# Patient Record
Sex: MALE | Race: BLACK OR AFRICAN AMERICAN
[De-identification: names, ages, dates, MRNs, and addresses within clinical notes are randomized per-mention and may not be internally consistent; named-entity substitution may affect disease eponyms.]

---

## 2018-02-14 ENCOUNTER — HOSPITAL ENCOUNTER (INPATIENT)
Dept: HOSPITAL 35 - ER | Age: 68
LOS: 1 days | Discharge: HOME | DRG: 246 | End: 2018-02-15
Attending: FAMILY MEDICINE | Admitting: FAMILY MEDICINE
Payer: COMMERCIAL

## 2018-02-14 VITALS — SYSTOLIC BLOOD PRESSURE: 127 MMHG | DIASTOLIC BLOOD PRESSURE: 71 MMHG

## 2018-02-14 VITALS — SYSTOLIC BLOOD PRESSURE: 145 MMHG | DIASTOLIC BLOOD PRESSURE: 79 MMHG

## 2018-02-14 VITALS — DIASTOLIC BLOOD PRESSURE: 83 MMHG | SYSTOLIC BLOOD PRESSURE: 151 MMHG

## 2018-02-14 VITALS — WEIGHT: 144.7 LBS | HEIGHT: 62.99 IN | BODY MASS INDEX: 25.64 KG/M2

## 2018-02-14 VITALS — SYSTOLIC BLOOD PRESSURE: 144 MMHG | DIASTOLIC BLOOD PRESSURE: 72 MMHG

## 2018-02-14 VITALS — SYSTOLIC BLOOD PRESSURE: 155 MMHG | DIASTOLIC BLOOD PRESSURE: 87 MMHG

## 2018-02-14 VITALS — DIASTOLIC BLOOD PRESSURE: 71 MMHG | SYSTOLIC BLOOD PRESSURE: 127 MMHG

## 2018-02-14 VITALS — DIASTOLIC BLOOD PRESSURE: 92 MMHG | SYSTOLIC BLOOD PRESSURE: 162 MMHG

## 2018-02-14 VITALS — DIASTOLIC BLOOD PRESSURE: 70 MMHG | SYSTOLIC BLOOD PRESSURE: 130 MMHG

## 2018-02-14 DIAGNOSIS — Z82.49: ICD-10-CM

## 2018-02-14 DIAGNOSIS — I12.9: ICD-10-CM

## 2018-02-14 DIAGNOSIS — E78.5: ICD-10-CM

## 2018-02-14 DIAGNOSIS — E11.22: ICD-10-CM

## 2018-02-14 DIAGNOSIS — Z90.49: ICD-10-CM

## 2018-02-14 DIAGNOSIS — G89.29: ICD-10-CM

## 2018-02-14 DIAGNOSIS — M54.9: ICD-10-CM

## 2018-02-14 DIAGNOSIS — N17.0: ICD-10-CM

## 2018-02-14 DIAGNOSIS — Z88.1: ICD-10-CM

## 2018-02-14 DIAGNOSIS — N18.9: ICD-10-CM

## 2018-02-14 DIAGNOSIS — Z95.5: ICD-10-CM

## 2018-02-14 DIAGNOSIS — T82.855A: Primary | ICD-10-CM

## 2018-02-14 DIAGNOSIS — I25.110: ICD-10-CM

## 2018-02-14 LAB
ANION GAP SERPL CALC-SCNC: 11 MMOL/L (ref 7–16)
BASOPHILS NFR BLD AUTO: 0.9 % (ref 0–2)
BUN SERPL-MCNC: 24 MG/DL (ref 7–18)
CALCIUM SERPL-MCNC: 9.3 MG/DL (ref 8.5–10.1)
CHLORIDE SERPL-SCNC: 102 MMOL/L (ref 98–107)
CO2 SERPL-SCNC: 25 MMOL/L (ref 21–32)
CREAT SERPL-MCNC: 1.5 MG/DL (ref 0.7–1.3)
EOSINOPHIL NFR BLD: 6.5 % (ref 0–3)
ERYTHROCYTE [DISTWIDTH] IN BLOOD BY AUTOMATED COUNT: 14.5 % (ref 10.5–14.5)
GLUCOSE SERPL-MCNC: 206 MG/DL (ref 74–106)
GRANULOCYTES NFR BLD MANUAL: 37.8 % (ref 36–66)
HCT VFR BLD CALC: 33.7 % (ref 42–52)
HGB BLD-MCNC: 12.1 GM/DL (ref 14–18)
LYMPHOCYTES NFR BLD AUTO: 47.4 % (ref 24–44)
MCH RBC QN AUTO: 29.7 PG (ref 26–34)
MCHC RBC AUTO-ENTMCNC: 35.8 G/DL (ref 28–37)
MCV RBC: 83.1 FL (ref 80–100)
MONOCYTES NFR BLD: 7.4 % (ref 1–8)
NEUTROPHILS # BLD: 3.3 THOU/UL (ref 1.4–8.2)
PLATELET # BLD: 218 THOU/UL (ref 150–400)
POTASSIUM SERPL-SCNC: 4.3 MMOL/L (ref 3.5–5.1)
RBC # BLD AUTO: 4.06 MIL/UL (ref 4.5–6)
SODIUM SERPL-SCNC: 138 MMOL/L (ref 136–145)
TROPONIN I SERPL-MCNC: < 0.04 NG/ML (ref ?–0.06)
WBC # BLD AUTO: 8.7 THOU/UL (ref 4–11)

## 2018-02-14 PROCEDURE — 10081 I&D PILONIDAL CYST COMP: CPT

## 2018-02-15 VITALS — DIASTOLIC BLOOD PRESSURE: 82 MMHG | SYSTOLIC BLOOD PRESSURE: 157 MMHG

## 2018-02-15 VITALS — DIASTOLIC BLOOD PRESSURE: 73 MMHG | SYSTOLIC BLOOD PRESSURE: 160 MMHG

## 2018-02-15 LAB
ANION GAP SERPL CALC-SCNC: 11 MMOL/L (ref 7–16)
BUN SERPL-MCNC: 18 MG/DL (ref 7–18)
CALCIUM SERPL-MCNC: 8.5 MG/DL (ref 8.5–10.1)
CHLORIDE SERPL-SCNC: 103 MMOL/L (ref 98–107)
CO2 SERPL-SCNC: 23 MMOL/L (ref 21–32)
CREAT SERPL-MCNC: 1.3 MG/DL (ref 0.7–1.3)
ERYTHROCYTE [DISTWIDTH] IN BLOOD BY AUTOMATED COUNT: 14.2 % (ref 10.5–14.5)
GLUCOSE SERPL-MCNC: 215 MG/DL (ref 74–106)
HCT VFR BLD CALC: 32.5 % (ref 42–52)
HGB BLD-MCNC: 11.5 GM/DL (ref 14–18)
MCH RBC QN AUTO: 29.4 PG (ref 26–34)
MCHC RBC AUTO-ENTMCNC: 35.5 G/DL (ref 28–37)
MCV RBC: 82.6 FL (ref 80–100)
PLATELET # BLD: 213 THOU/UL (ref 150–400)
POTASSIUM SERPL-SCNC: 4.3 MMOL/L (ref 3.5–5.1)
RBC # BLD AUTO: 3.93 MIL/UL (ref 4.5–6)
SODIUM SERPL-SCNC: 137 MMOL/L (ref 136–145)
TROPONIN I SERPL-MCNC: 0.15 NG/ML (ref ?–0.06)
WBC # BLD AUTO: 8.6 THOU/UL (ref 4–11)

## 2018-02-15 PROCEDURE — 027034Z DILATION OF CORONARY ARTERY, ONE ARTERY WITH DRUG-ELUTING INTRALUMINAL DEVICE, PERCUTANEOUS APPROACH: ICD-10-PCS

## 2018-02-15 PROCEDURE — 4A023N7 MEASUREMENT OF CARDIAC SAMPLING AND PRESSURE, LEFT HEART, PERCUTANEOUS APPROACH: ICD-10-PCS

## 2018-02-15 PROCEDURE — B2111ZZ FLUOROSCOPY OF MULTIPLE CORONARY ARTERIES USING LOW OSMOLAR CONTRAST: ICD-10-PCS

## 2018-04-18 NOTE — H
[address]  [address]                    HISTORY AND PHYSICAL          
_______________________________________________________________________________
 
Name:       AMBER KIRAN SR            Room:           [address] Sh[place] HARDING#:  [id number]      Account #:      [id number]  
Admission:  04/18/18     Attend Phys:    Jerald June MD, 
Discharge:  04/19/18     Date of Birth:  [birth year]  
         Report #: [id number]
                                                                                
_______________________________________________________________________________
THIS REPORT FOR:  //name//                      
 
Please refer to the History and Physical performed in the physician's office.
 
 
 
 
 
 
 
 
 
 
 
 
 
 
 
 
 
 
 
 
 
 
 
 
 
 
 
 
 
 
 
 
 
 
 
 
 
 
 
 
 
                       
                                        By:                                
                 
D: 04/18/18     _______________________________________
T: 04/20/18 1437Medical Records Staff HINA       /AL

## 2018-04-18 NOTE — EKG
[place] [address]
Phone:  [phone]                     ELECTROCARDIOGRAM REPORT      
_______________________________________________________________________________
 
Name:       AMBER KIRAN SR            Room:           [address] [place]  
M.R.#:  [id number]      Account #:      [id number]  
Admission:  18     Attend Phys:    Jerald June MD, 
Discharge:               Date of Birth:  [birth year]  
         Report #: [id number]
    [id number]
_______________________________________________________________________________
THIS REPORT FOR:  //name//                      
 
                          [facility]
                                       
Test Date:    2018               Test Time:    15:02:11
Pat Name:     AMBER KIRAN           Department:   
Patient ID:   [id number]            Room:         [facility]
Gender:       M                        Technician:   [facility]
:          1950               Requested By: Jerald June
Order Number: [id number]    Reading MD:   Jerald June
                                 Measurements
Intervals                              Axis          
Rate:         57                       P:            -3
CA:           208                      QRS:          -27
QRSD:         100                      T:            -20
QT:           404                                    
QTc:          394                                    
                           Interpretive Statements
Sinus rhythm
Left ventricular hypertrophy
Borderline T abnormalities, inferior leads
Compared to ECG 2017 03:24:22
Ectopic atrial rhythm no longer present
T-wave abnormality still present
 
Electronically Signed On 2018 16:16:54 CDT by Jerald June
[email or web address]
 
 
 
 
 
 
 
 
 
 
 
 
 
 
 
 
  <ELECTRONICALLY SIGNED>
                                           By: Jerald June MD, FACC     
  18     1616
D: 18 1502   _____________________________________
T: 18 1502   Jerald June MD, [facility]       /EPI

## 2018-04-19 NOTE — EKG
[place] [address]
Phone:  [phone]                     ELECTROCARDIOGRAM REPORT      
_______________________________________________________________________________
 
Name:       AMBER KIRAN SR            Room:           [address]#:  [id number]      Account #:      [id number]  
Admission:  18     Attend Phys:    Jerald June MD, 
Discharge:  18     Date of Birth:  [birth year]  
         Report #: [id number]
    [id number]
_______________________________________________________________________________
THIS REPORT FOR:  //name//                      
 
                          [facility]
                                       
Test Date:    2018               Test Time:    08:47:57
Pat Name:     AMBER KIRAN           Department:   
Patient ID:   [id number]            Room:         [facility]
Gender:       M                        Technician:   
:          1950               Requested By: Jerald June
Order Number: [id number]    Reading MD:   Jerald June
                                 Measurements
Intervals                              Axis          
Rate:         67                       P:            60
WV:           188                      QRS:          -17
QRSD:         101                      T:            -40
QT:           401                                    
QTc:          424                                    
                           Interpretive Statements
Sinus rhythm
Abnormal R-wave progression, early transition
Left ventricular hypertrophy
Borderline T abnormalities, inferior leads
Compared to ECG 2018 15:02:11
No significant changes
 
Electronically Signed On 2018 17:49:13 CDT by Jerald June
[email or web address]
 
 
 
 
 
 
 
 
 
 
 
 
 
 
 
 
  <ELECTRONICALLY SIGNED>
                                           By: Jerald June MD, [facility]     
  18     1749
D: 18 0847   _____________________________________
T: 18 0847   Jerald June MD, [facility]       /EPI

## 2018-04-21 NOTE — CARD
[address]  [address]                    CARDIAC CATH REPORT           
_______________________________________________________________________________
 
Name:       AMBER KIRAN SR            Room:           [address] 
MEHDI#:  [id number]      Account #:      [id number]  
Admission:  04/18/18     Attend Phys:    Jerald June MD, 
Discharge:  04/19/18     Date of Birth:  [birth year]  
         Report #: [id number]
                                                                     [id number]
_______________________________________________________________________________
THIS REPORT FOR:  //name//                      
 
 
--------------- APPROVED REPORT --------------
 
 
Study performed:  04/18/2018 12:46:59
 
Patient Details
Patient Status: Out-Patient                  Room #: 
The patient is a 67 year-old male
 
Event Personnel
Jerald June  Cardiologist, Lyndsey Eason RN Circulator, 
Willie Jo (R) Monitor, Chetna Momin  
Scrub
 
Procedures Performed
MARLIN Place w/wo Plasty Single RCA left heart catheterization selective 
coronary arteriography
 
Indication
Unstable angina 
 
Risk Factors
Family History, Hypercholesterolemia, Hypertension
 
Previous Procedures/Diagnoses
Previous PCI
 
Procedure Narrative
The patient was brought electively to the Cardiac Catheterization 
Laboratory and was prepped and draped in a sterile manner. The right 
femoral was infiltrated with 1% Lidocaine subcutaneous anesthesia. A 
6fr Ultimum Sheath sheath was inserted into the Right Femoral Artery. 
Coronary angiography was performed using coronary diagnostic 
catheters. The right coronary system was accessed and visualized with 
a Diagnostic 3DRC catheter. The left coronary system was accessed and 
visualized with a Diagnostic JL 3.5 catheter. The left ventricle was 
accessed and visualized with a Diagnostic Straight Pig catheter. Left 
ventricular/Aortic Valve gradient assessed via catheter pullback. 
Pre-demployment femoral angiogram was performed . Closure device was 
deployed with a Fr Angioseal STS 6Fr. The patient tolerated the 
procedure well and there were no complications associated with the 
procedure. 
 
 
 
 
[place] [address]                    CARDIAC CATH REPORT           
_______________________________________________________________________________
 
Name:       AMBER KIRAN SR            [address]           [address]  [id number]      Account #:      [id number]  
Admission:  04/18/18     Attend Phys:    Jerald June MD, 
Discharge:  04/19/18     Date of Birth:  [birth year]  
         Report #: [id number]
                                                                     [id number]
_______________________________________________________________________________
Intraoperative Conscious Sedation
Sedation start time:  13:26           Case end Time:  
14:25    
Fentanyl  50 mcg    Versed  2 mg  
 
Fluoro Time:    15.1 minutes     
Dose:     DAP 46611 cGycm2  1013 mGy  
Contrast Type and Amount:  Visipaque 180 ml    
 
Coronary Angiography
The patient's coronary anatomy is right dominant. 
 
Diagnostic Cath
Left Main 0% narrowing
LAD  40% mid LAD narrowing
Circumflex 40% proximal circumflex narrowing with 50% narrowing 
within the proximal portion of the stented first marginal 
branch
Right Coronary Modest size dominant vessel with 80% proximal 
narrowing and 50% tubular mid vessel narrowing with 30% distal right 
coronary narrowing
 
Hemodynamics
The aortic pressure is 165/72 mmHg with a mean of 103 mmHg. The left 
ventricular pressure is 147/3 mmHg with a mean of mmHg. The left 
ventricular end diastolic pressure is 12 mmHg. There was no gradient 
across the aortic valve upon pullback. 
 
PCI Technique Lesion
Anticoagulation was achieved with Angiomax. Patient was preloaded 
with Angiomax IV 10.5 ml. Percutaneous coronary intervention was 
performed on the proximal right coronary artery. The lesion stenosis 
prior to intervention was 80% with LINDA 3 flow. A 6Fr AR 1 Guide 
Catheter was used to engage the ostium. A IG: ProwaterFlex 180CM 
Interventional Guidewire was used to cross the lesion.
 
BALLOON DILATION
A Balloon catheter Trek RX 2.25 X 12 was inserted and inflated up to 
14.00atm for 16seconds.
 
STENT DEPLOYMENT
A drug-eluting stent Xience Alpine RX 2.5X12 was inserted and 
inflated up to 20.00atm for 12seconds. Additional Inflation: 12.00atm 
for 14seconds. Additional Inflation: 15.00atm for 17seconds.
 
Final angiography reveals 0 % stenosis with LINDA 3 
 
 
 
[place] [address]                    CARDIAC CATH REPORT           
_______________________________________________________________________________
 
Name:       AMBER KIRAN SR            [address]:           [address]  [id number]      Account #:      [id number]  
Admission:  04/18/18     Attend Phys:    Jerald June MD, 
Discharge:  04/19/18     Date of Birth:  [birth year]  
         Report #: [id number]
                                                                     [id number]
_______________________________________________________________________________
flow.
 
Conclusion
#1 significant coronary artery disease characterized by the 
following:
 
A 40% mid LAD narrowing,
 
B 40% proximal circumflex narrowing with 50% narrowing of the 
proximal portion of the first marginal branch within a stented 
segment,
 
C 80% proximal right coronary narrowing with 50% tubular mid vessel 
narrowing and 30% distal right coronary narrowing
 
#2 mild systemic systolic hypertension
 
#3 successful percutaneous coronary intervention with deployment of 
drug-eluting stent at site of 80% proximal right coronary stenosis 
with 0% residual narrowing following stent deployment and LINDA-3 to  
flow the distal vessel. 
 
Recommendations
Aggressive Medical Therapy
 
Medications Administered
Aspirin (any)   
Prasugrel   
Angiomax bolus and infusion
 
 
 
 
 
 
 
 
 
 
 
 
 
 
 
<ELECTRONICALLY SIGNED>
                                        By:  Jerald June MD, FACC     
04/21/18     1329
D: 04/21/18 1329_______________________________________
T: 04/21/18 1329Jerald June MD, FAC        /INF

## 2018-04-22 NOTE — D
[address]  [address]                    DISCHARGE SUMMARY             
_______________________________________________________________________________
 
Name:       AMBER KIRAN SR            Room:           [address] [place] HARDING#:  [id number]      Account #:      [id number]  
Admission:  04/18/18     Attend Phys:    Jerald June MD, 
Discharge:  04/19/18     Date of Birth:  [birth year]  
         Report #: [id number]
                                                                     [id number]  
_______________________________________________________________________________
THIS REPORT FOR:  //name//                      
 
[facility]
 
DATE OF SERVICE:  04/19/2018
 
 
FINAL DISCHARGE DIAGNOSES:
1.  Unstable angina.
2.  Coronary artery disease.
3.  Status post recent percutaneous coronary intervention to the circumflex and
percutaneous coronary intervention of the right coronary artery on 04/18/2018.
4.  Type 2 diabetes.
5.  Hypertension.
6.  Hypercholesterolemia.
 
PROCEDURES:  04/18/2018-left heart catheterization, selective coronary
arteriography, percutaneous coronary intervention with deployment of
drug-eluting stent at the proximal right coronary artery site of 80% stenosis.
 
The patient is a very pleasant 67-year-old male with complex coronary artery
disease and multiple prior percutaneous coronary interventions, several weeks
ago, he presented with unstable angina, underwent stenting of a bifurcation
circumflex lesion involving the mid circumflex and the first marginal branch. 
He was also noted to have a moderately severe, 80% proximal right coronary
artery stenosis, has had some intercurrent chest discomfort compatible with
recurrent angina.  In this context, I performed cardiac catheterization on
04/18/2018.  That revealed widely patent mid circumflex stent with 30% first
marginal in-stent narrowing with 80% proximal right coronary artery narrowing
followed by tubular 50-60% mid right coronary artery stenosis.  There was 40%
proximal LAD narrowing.
 
Given this data and the clinical scenario, I proceeded with percutaneous
coronary intervention, deploying one 2.5 x 12 mm Xience Alpine drug-eluting
stent in the proximal right coronary artery with 0% residual narrowing and LINDA
3 flow of the distal vessel.
 
The patient did well post procedurally and there was good hemostasis at the
right femoral site of catheterization.  He ambulated in the hallways without
difficulty.
 
Laboratory on 04/19/2018, revealed a hemoglobin of 11.6, white blood cell count
of 7800 with _____ platelets.  Sodium 139, potassium 4.2, BUN 16, creatinine 1.4
(down from 1.6 preprocedurally) with a glucose of 166 mg percent.
 
 
 
 
[place] [address]                    DISCHARGE SUMMARY             
_______________________________________________________________________________
 
Name:       AMBER KIRAN SR            [address]:           [address]  [id number]      Account #:      [id number]  
Admission:  04/18/18     Attend Phys:    Jerald June MD, 
Discharge:  04/19/18     Date of Birth:  [birth year]  
         Report #: [id number]
                                                                     [id number]  
_______________________________________________________________________________
He was discharged to home in stable condition on the following medications: 
Ascorbic calcium or vitamin C 1000 mg daily, aspirin 81 mg daily, atorvastatin
20 mg at bedtime, vitamin D3 2000 units daily, glipizide 10 mg b.i.d.,
hydrochlorothiazide 12.5 mg daily, Imdur 30 mg daily, lisinopril 20 mg b.i.d.,
metformin 1000 mg 2-1/2 tablets daily to be resumed on 04/20/2018, metoprolol
tartrate 50 mg daily, prasugrel 10 mg daily, and Coenzyme Q _____ mg daily.
 
I will plan to see the patient in 4 weeks in the clinic for followup.
 
The patient discharged form room 221 on 04/19/2018.
 
 
 
 
 
 
 
 
 
 
 
 
 
 
 
 
 
 
 
 
 
 
 
 
 
 
 
 
 
 
 
 
 
 
<ELECTRONICALLY SIGNED>
                                        By:  Jerald June MD, FACC     
04/22/18     0955
D: 04/19/18 0959_______________________________________
T: 04/19/18 1121John JG June MD, FACC        /nt

## 2018-12-17 ENCOUNTER — HOSPITAL ENCOUNTER (INPATIENT)
Dept: HOSPITAL 35 - ER | Age: 68
LOS: 2 days | Discharge: HOME | DRG: 250 | End: 2018-12-19
Attending: HOSPITALIST
Payer: COMMERCIAL

## 2018-12-17 VITALS — WEIGHT: 153.1 LBS | BODY MASS INDEX: 27.12 KG/M2 | HEIGHT: 63 IN

## 2018-12-17 VITALS — SYSTOLIC BLOOD PRESSURE: 142 MMHG | DIASTOLIC BLOOD PRESSURE: 68 MMHG

## 2018-12-17 VITALS — SYSTOLIC BLOOD PRESSURE: 165 MMHG | DIASTOLIC BLOOD PRESSURE: 78 MMHG

## 2018-12-17 VITALS — DIASTOLIC BLOOD PRESSURE: 70 MMHG | SYSTOLIC BLOOD PRESSURE: 134 MMHG

## 2018-12-17 VITALS — SYSTOLIC BLOOD PRESSURE: 127 MMHG | DIASTOLIC BLOOD PRESSURE: 68 MMHG

## 2018-12-17 VITALS — DIASTOLIC BLOOD PRESSURE: 72 MMHG | SYSTOLIC BLOOD PRESSURE: 166 MMHG

## 2018-12-17 VITALS — SYSTOLIC BLOOD PRESSURE: 121 MMHG | DIASTOLIC BLOOD PRESSURE: 60 MMHG

## 2018-12-17 DIAGNOSIS — Z90.49: ICD-10-CM

## 2018-12-17 DIAGNOSIS — T82.867A: ICD-10-CM

## 2018-12-17 DIAGNOSIS — I10: ICD-10-CM

## 2018-12-17 DIAGNOSIS — N18.9: ICD-10-CM

## 2018-12-17 DIAGNOSIS — Z79.82: ICD-10-CM
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
## 2018-12-17 DIAGNOSIS — E83.42: ICD-10-CM

## 2018-12-17 DIAGNOSIS — E78.5: ICD-10-CM

## 2018-12-17 DIAGNOSIS — I12.9: ICD-10-CM

## 2018-12-17 DIAGNOSIS — E11.65: ICD-10-CM
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
## 2018-12-17 DIAGNOSIS — Z88.8: ICD-10-CM

## 2018-12-17 DIAGNOSIS — Z79.84: ICD-10-CM

## 2018-12-17 DIAGNOSIS — Z95.5: ICD-10-CM

## 2018-12-17 DIAGNOSIS — I50.33: ICD-10-CM

## 2018-12-17 DIAGNOSIS — I25.110: Primary | ICD-10-CM

## 2018-12-17 DIAGNOSIS — Z79.899: ICD-10-CM

## 2018-12-17 DIAGNOSIS — E11.22: ICD-10-CM

## 2018-12-17 LAB
ALBUMIN SERPL-MCNC: 3.8 G/DL (ref 3.4–5)
ALBUMIN SERPL-MCNC: 3.8 G/DL (ref 3.4–5)
ALT SERPL-CCNC: 17 U/L (ref 30–65)
ANION GAP SERPL CALC-SCNC: 8 MMOL/L (ref 7–16)
AST SERPL-CCNC: 15 U/L (ref 15–37)
BASOPHILS NFR BLD AUTO: 1.2 % (ref 0–2)
BILIRUB SERPL-MCNC: 0.5 MG/DL
BUN SERPL-MCNC: 22 MG/DL (ref 7–18)
CALCIUM SERPL-MCNC: 9.4 MG/DL (ref 8.5–10.1)
CHLORIDE SERPL-SCNC: 100 MMOL/L (ref 98–107)
CO2 SERPL-SCNC: 29 MMOL/L (ref 21–32)
CREAT SERPL-MCNC: 1.6 MG/DL (ref 0.7–1.3)
EOSINOPHIL NFR BLD: 6.5 % (ref 0–3)
ERYTHROCYTE [DISTWIDTH] IN BLOOD BY AUTOMATED COUNT: 13.5 % (ref 10.5–14.5)
ERYTHROCYTE [DISTWIDTH] IN BLOOD BY AUTOMATED COUNT: 13.9 % (ref 10.5–14.5)
GLUCOSE SERPL-MCNC: 325 MG/DL (ref 74–106)
GRANULOCYTES NFR BLD MANUAL: 46 % (ref 36–66)
HCT VFR BLD CALC: 32.8 % (ref 42–52)
HCT VFR BLD CALC: 33.3 % (ref 42–52)
HGB BLD-MCNC: 11.7 GM/DL (ref 14–18)
HGB BLD-MCNC: 11.8 GM/DL (ref 14–18)
INR PPP: 1.1
LYMPHOCYTES NFR BLD AUTO: 38.7 % (ref 24–44)
MAGNESIUM SERPL-MCNC: 1.3 MG/DL (ref 1.8–2.4)
MCH RBC QN AUTO: 29.9 PG (ref 26–34)
MCH RBC QN AUTO: 30 PG (ref 26–34)
MCHC RBC AUTO-ENTMCNC: 35.5 G/DL (ref 28–37)
MCHC RBC AUTO-ENTMCNC: 35.6 G/DL (ref 28–37)
MCV RBC: 83.9 FL (ref 80–100)
MCV RBC: 84.3 FL (ref 80–100)
MONOCYTES NFR BLD: 7.6 % (ref 1–8)
NEUTROPHILS # BLD: 2.7 THOU/UL (ref 1.4–8.2)
PLATELET # BLD: 221 THOU/UL (ref 150–400)
PLATELET # BLD: 226 THOU/UL (ref 150–400)
POTASSIUM SERPL-SCNC: 4.3 MMOL/L (ref 3.5–5.1)
PROT SERPL-MCNC: 7.3 G/DL (ref 6.4–8.2)
PROT SERPL-MCNC: 7.3 G/DL (ref 6.4–8.2)
PROTHROMBIN TIME: 11 SECONDS (ref 9.3–11.4)
RBC # BLD AUTO: 3.91 MIL/UL (ref 4.5–6)
RBC # BLD AUTO: 3.95 MIL/UL (ref 4.5–6)
SODIUM SERPL-SCNC: 137 MMOL/L (ref 136–145)
TROPONIN I SERPL-MCNC: <0.06 NG/ML (ref ?–0.06)
TSH SERPL-ACNC: 1.57 UIU/ML (ref 0.36–3.74)
VIT B12 SERPL-MCNC: 284 PG/ML (ref 193–986)
WBC # BLD AUTO: 5.9 THOU/UL (ref 4–11)
WBC # BLD AUTO: 6.5 THOU/UL (ref 4–11)

## 2018-12-17 PROCEDURE — 10081 I&D PILONIDAL CYST COMP: CPT

## 2018-12-17 NOTE — 2DMMODE
[facility]
[address]
[place]  [address]
Phone:  [phone] 2 D/M-MODE ECHOCARDIOGRAM     
_______________________________________________________________________________
 
Name:            DARRICK GOODMAN       Room #:        170-1       [facility] [place]
[facility]#:           [id number]          Account #:     [id number]  
Admission:       12/17/18         Attend Phys:   Ruddy Sánchez,
Discharge:                  Date of Birth: [birth year]  
Date of Service: 12/17/18 1541               Report #:      [id number]
        [id number]
_______________________________________________________________________________
THIS REPORT FOR:   //name//                          
 
 
--------------- APPROVED REPORT --------------
 
 
Study performed:  12/17/2018 14:33:15
 
EXAM: Comprehensive 2D, Doppler, and color-flow 
Echocardiogram 
Patient Location: In-Patient   
Room #:  ER 1     Status:  routine
 
      BSA:         1.71
HR: 65 bpm BP:          138/62 mmHg 
Rhythm: NSR     
 
Other Information 
Study Quality: Excellent
 
Indications
Diabetes
CAD
Chest Pain
Hypertension/HDD
 
2D Dimensions
RVDd:  24.23 mm  
IVSd:  10.82 (7-11mm) LVOT Diam:  21.75 (18-24mm) 
LVDd:  38.44 mm  
PWd:  9.96 (7-11mm) Ascending Ao:  28.74 (22-36mm)
LVDs:  25.92 (25-40mm) 
Aortic Root:  28.88 mm IVC:  6.00 mm
 
Volumes
Left Atrial Volume (Systole) 
Single Plane 4CH:  41.36 mL Single Plane 2CH:  32.47 mL
    LA ESV Index:  23.00 mL/m2
 
Aortic Valve
AoV Peak Pieter.:  1.11 m/s 
AO Peak Gr.:  4.89 mmHg  LVOT Max PG:  3.63 mmHg
    LVOT Max V:  0.95 m/s
PINKY Vmax: 3.20 cm2  
 
Mitral Valve
 
 
[facility]
[address]
[place]  [address]
Phone:  [phone]                    2 D/M-MODE ECHOCARDIOGRAM     
_______________________________________________________________________________
 
Name:            REXDARRICK RICHARD       Room #:        170-1       [facility] [place]
[facility]#:           [id number]          Account #:     [id number]  
Admission:       12/17/18         Attend Phys:   Ruddy Sánchez,
Discharge:                  Date of Birth: [birth year]  
Date of Service: 12/17/18 1541               Report #:      [id number]
        [id number]
_______________________________________________________________________________
    E/A Ratio:  0.7
    MV Decel. Time:  214.33 ms
MV E Max Pieter.:  0.65 m/s 
MV A Pieter.:  0.90 m/s  
MV PHT:  62.16 ms  
IVRT:  107.27 ms  
 
Pulmonary Valve
PV Peak Pieter.:  0.87 m/s PV Peak Gr.:  3.04 mmHg
 
Pulmonary Vein
P Vein S:    0.43 m/s P Vein A:  0.40 m/s
P Vein D:   0.30 m/s P Vein A Dur.:  101.5 msec
P Vein S/D Ratio:  1.43 
 
Tricuspid Valve
 RAP Estimate:  5.00 mmHg
 
Left Ventricle
The left ventricle is normal size. There is normal left ventricular 
wall thickness. The left ventricular systolic function is normal. The 
left ventricular ejection fraction is within the normal range. LVEF 
is 55%. Mild diastolic dysfunction is present (impaired relaxation 
pattern).
 
Right Ventricle
The right ventricle is normal size. The right ventricular systolic 
function is normal.
 
Atria
The left atrium size is normal. The right atrium size is 
normal.
 
Aortic Valve
Mild aortic valve sclerosis. Trace aortic regurgitation. There is no 
aortic valvular stenosis.
 
Mitral Valve
The mitral valve is normal in structure. Trace mitral regurgitation. 
No evidence of mitral valve stenosis.
 
Tricuspid Valve
The tricuspid valve is normal in structure. Trace tricuspid 
regurgitation. Unable to assess PA pressure.
 
Pulmonic Valve
 
 
[facility]
[address] [place]  [address]
Phone:  [phone]                    2 D/M-MODE ECHOCARDIOGRAM     
_______________________________________________________________________________
 
Name:            DARRICK GOODMAN       Room #:        170-1       [place] [place]
M.R.#:           [id number]          Account #:     [id number]  
Admission:       12/17/18         Attend Phys:   Ruddy Sánchez,
Discharge:                  Date of Birth: [birth year]  
Date of Service: 12/17/18 1541               Report #:      [id number]
        [id number]
_______________________________________________________________________________
The pulmonary valve is normal in structure. Trace to mild pulmonic 
regurgitation.
 
Great Vessels
The aortic root is normal in size. IVC is normal in size and 
collapses &gt;50% with inspiration.
 
Pericardium
There is no pericardial effusion.
 
&lt;Conclusion&gt;
The left ventricular systolic function is normal.
The left ventricular ejection fraction is within the normal range.
 
 
 
 
 
 
 
 
 
 
 
 
 
 
 
 
 
 
 
 
 
 
 
 
 
 
 
 
 
 
 
  <ELECTRONICALLY SIGNED>
   By: Martín Diggs MD, [facility]      
  12/17/18     1541
D: 12/17/18 1541                           _____________________________________
T: 12/17/18 1541                           Martín Diggs MD, FAC        /INF

## 2018-12-17 NOTE — NUR
TO THE UBNIT FROM THE ER - ADMISSION ASSESSMENT AS CHARTED - PT TO THE CATH
LAB IN THE AM -  NO CO'S OF PAIN OR NAUSEA.  APPEARS TO BE COMFROTABLE AT THE
PRESENT TIME.

## 2018-12-18 VITALS — SYSTOLIC BLOOD PRESSURE: 168 MMHG | DIASTOLIC BLOOD PRESSURE: 84 MMHG

## 2018-12-18 VITALS — DIASTOLIC BLOOD PRESSURE: 65 MMHG | SYSTOLIC BLOOD PRESSURE: 117 MMHG

## 2018-12-18 VITALS — SYSTOLIC BLOOD PRESSURE: 148 MMHG | DIASTOLIC BLOOD PRESSURE: 75 MMHG

## 2018-12-18 VITALS — SYSTOLIC BLOOD PRESSURE: 156 MMHG | DIASTOLIC BLOOD PRESSURE: 76 MMHG

## 2018-12-18 LAB
ANION GAP SERPL CALC-SCNC: 10 MMOL/L (ref 7–16)
BUN SERPL-MCNC: 19 MG/DL (ref 7–18)
CALCIUM SERPL-MCNC: 9.5 MG/DL (ref 8.5–10.1)
CHLORIDE SERPL-SCNC: 102 MMOL/L (ref 98–107)
CHOLEST SERPL-MCNC: 101 MG/DL (ref ?–200)
CO2 SERPL-SCNC: 27 MMOL/L (ref 21–32)
CREAT SERPL-MCNC: 1.6 MG/DL (ref 0.7–1.3)
ERYTHROCYTE [DISTWIDTH] IN BLOOD BY AUTOMATED COUNT: 13.4 % (ref 10.5–14.5)
EST. AVERAGE GLUCOSE BLD GHB EST-MCNC: 226 MG/DL
GLUCOSE SERPL-MCNC: 174 MG/DL (ref 74–106)
GLYCOHEMOGLOBIN (HGB A1C): 9.5 % (ref 4.8–5.6)
HCT VFR BLD CALC: 33.5 % (ref 42–52)
HDLC SERPL-MCNC: 30 MG/DL (ref 40–?)
HGB BLD-MCNC: 11.5 GM/DL (ref 14–18)
LDLC SERPL-MCNC: 49 MG/DL (ref ?–100)
MAGNESIUM SERPL-MCNC: 1.7 MG/DL (ref 1.8–2.4)
MCH RBC QN AUTO: 29.1 PG (ref 26–34)
MCHC RBC AUTO-ENTMCNC: 34.4 G/DL (ref 28–37)
MCV RBC: 84.5 FL (ref 80–100)
PLATELET # BLD: 227 THOU/UL (ref 150–400)
POTASSIUM SERPL-SCNC: 3.9 MMOL/L (ref 3.5–5.1)
RBC # BLD AUTO: 3.96 MIL/UL (ref 4.5–6)
SODIUM SERPL-SCNC: 139 MMOL/L (ref 136–145)
TC:HDL: 3.4 RATIO
TRIGL SERPL-MCNC: 112 MG/DL (ref ?–150)
VLDLC SERPL CALC-MCNC: 22 MG/DL (ref ?–40)
WBC # BLD AUTO: 6.9 THOU/UL (ref 4–11)

## 2018-12-18 NOTE — CATHLAB
[facility]
[employer]
[place]  [address]
Phone:  [phone]                    INVASIVE PROCEDURE REPORT     
_______________________________________________________________________________
 
Name:            REXDARRICK HILARY       Room #:        200-I       ADM IN
[facility].#:           [id number]          Account #:     [id number]  
Admission:       12/17/18         Attend Phys:   Ruddy Sánchez,
Discharge:                  Date of Birth: [birth year]  
Date of Service: 12/18/18 1601               Report #:      [id number]
        [id number]
_______________________________________________________________________________
THIS REPORT FOR:   //name//                          
 
 
--------------- APPROVED REPORT --------------
 
 
Study performed:  12/18/2018 13:20:29
 
Patient Details
Patient Status: In-Patient                  Room #: 
The patient is a 68 year-old male
 
Event Personnel
Martín Diggs  Interventional Cardiologist, Julius Colunga RN RN, 
Sandifer, David Scrub, Brea Montez  Monitor
 
Procedures Performed
Art Access - R femoral artery*  30834 Initial Mod Sed Same Phys/QHP 
Gr5y 262003 76789 Mod Sed Same Phys/QHP Ea 776696 Left Heart Cath 
w/or w/o Coronaries 5410330 C PTCA Single Vessel OM 5533200 
PCISINGLE Hemostasis w/ Angioseal
 
Indication
Unstable angina , Chest pain
 
Risk Factors
Arterial Hypertension, Hypercholesterolemia, Diabetes 
 
Previous Procedures/Diagnoses
Previous PCI
 
Admission/Lab Medications/Medications given during 
procedure
Glycoprotein IllbIlla Inhibitors, Heparin Unfract.
 
Procedure Narrative
The patient was brought electively to the Cardiac Catheterization 
Laboratory and was prepped and draped in a sterile manner. The Right 
Groin^ was infiltrated with 1% Lidocaine subcutaneous anesthesia. A 
PINNACLE 6FR Sheath #[id number] sheath was inserted into the RFA^. 
Coronary angiography was performed using coronary diagnostic 
catheters. The right coronary system was accessed and visualized with 
a JR 4 catheter. The left coronary system was accessed and visualized 
with a JL 4 catheter. The left ventricle was accessed and visualized 
with a JR 4 catheter. Left ventricular/Aortic Valve gradient assessed 
via catheter pullback. Pre-demployment femoral angiogram was 
 
 
[facility]
[employer]
[place]  [address]
Phone:  [phone]                    INVASIVE PROCEDURE REPORT     
_______________________________________________________________________________
 
Name:            DARRICK GOODMAN       Room #:        200-I       [facility] [place]
[facility]#:           [id number]          Account #:     [id number]  
Admission:       12/17/18         Attend Phys:   Ruddy Sánchez,
Discharge:                  Date of Birth: [birth year]  
Date of Service: 12/18/18 1601               Report #:      [id number]
        [id number]
_______________________________________________________________________________
performed . Closure device was deployed with a 6 Fr Angioseal. The 
patient tolerated the procedure well and there were no complications 
associated with the procedure. There was no hematoma.
 
Intraoperative Conscious Sedation
Sedation start time:  14:30           Case end Time:  
15:15    
Fentanyl  50 mcg    Versed  --2 mg  
 
Fluoro Time:    4.10 minutes    
Dose:     DAP 2948.00 cGycm2  340 mGy  
Contrast Type and Amount:  Visipaque 110 ml   
 
Coronary Angiography
The patient's coronary anatomy is right dominant. 
 
Diagnostic Cath
Left Main 0% stenosis
LAD  30% mid stenosis
Circumflex mid stent without restenosis
OM2  proximal stent with 90% restenosis and thrombus
Right Coronary proximal stent without restenosis.  80% stenosis noted 
in the mid rca, and 50% in the distal rca
 
Left Ventriculography
Left Ventriculography was not performed.     
 
Hemodynamics
The aortic pressure is 149/61 mmHg with a mean of 94 mmHg. The left 
ventricular pressure is 156/18 mmHg with a mean of mmHg. The left 
ventricular end diastolic pressure is 20 mmHg. There was no gradient 
across the aortic valve upon pullback. Pullback from the left 
ventricle to the aorta revealed no gradient across the aortic 
valve.
 
PCI Technique Lesion
Anticoagulation was achieved with Heparin. iv integrelin given 
Percutaneous coronary intervention was performed on the second obtuse 
marginal branch segment. The lesion stenosis prior to intervention 
was 90% with JESSICA 3 flow. A VISTA 6FR XB 3 #[id number] Guide Catheter was 
used to engage the lm ostium. A BMW Wire .014 X 190CM #[id number] 
Interventional Guidewire was used to cross the lesion.
 
BALLOON DILATION
A Balloon catheter TREK RX 2.5 X 12 #[id number] was inserted and inflated 
up to 12.00atm for 19seconds. Repeat angiography revealed the 
 
 
[facility]
[address] [place]  [address]
Phone:  [phone]                    INVASIVE PROCEDURE REPORT     
_______________________________________________________________________________
 
Name:            DARRICK GOODMAN       Room #:        200-I       [facility] [place]
..#:           [id number]          Account #:     [id number]  
Admission:       12/17/18         Attend Phys:   Ruddy Sánchez,
Discharge:                  Date of Birth: [birth year]  
Date of Service: 12/18/18 1601               Report #:      [id number]
        [id number]
_______________________________________________________________________________
following post-dilatation results: 0% stenosis. Additional Inflation: 
16.00atm for 23seconds. Additional Inflation: 17.00atm for 16seconds. 
Thrombus noted at start of procedure but none seen following 
PTCA.
 
Final angiography reveals 0 % stenosis with JESSICA 3 
flow.
 
Conclusion
1.  no restenosis noted of stent in the circumflex, but 90% stenosis 
of stent in the second marginal branch with thrombus
2.  no restenosis noted of stent in the proximal rca, but 80% 
stenosis in the mid rca
3.  successful PTCA of stent in the 2nd marginal branch 
 
Recommendations
1.  plan staged stenting of mid rca at a later date
 
Medications Administered
Ticagrelor
 
 
 
 
 
 
 
 
 
 
 
 
 
 
 
 
 
 
 
 
 
 
 
 
  <ELECTRONICALLY SIGNED>
   By: Martín Diggs MD, FACC      
  12/18/18     1601
D: 12/18/18 1601                           _____________________________________
T: 12/18/18 1601                           Martín Diggs MD, FACC        /INF

## 2018-12-18 NOTE — EKG
[address] [place] [employer]  [address]
Phone:  [phone]                    ELECTROCARDIOGRAM REPORT      
_______________________________________________________________________________
 
Name:       DARRICK GOODMAN       Room #:         200-I       ADM IN  
M.R.#:      [id number]     Account #:      [id number]  
Admission:  18    Attend Phys:    Ruddy Sánchez MD 
Discharge:              Date of Birth:  [birth year]  
                                                          Report #: [id number]
   [id number]
_______________________________________________________________________________
THIS REPORT FOR:   //name//                          
 
                          [facility]
                                       
Test Date:    2018               Test Time:    15:58:20
Pat Name:     DARRICK GOODMAN           Department:   
Patient ID:   [id number]            Room:         200 I
Gender:       M                        Technician:   OSMANY MENDOZA
:          1950               Requested By: Martín Diggs
Order Number: [id number] MD:   Chuy iRco
                                 Measurements
Intervals                              Axis          
Rate:         56                       P:            49
MI:           203                      QRS:          -29
QRSD:         100                      T:            -29
QT:           419                                    
QTc:          405                                    
                           Interpretive Statements
Sinus rhythm
Left ventricular hypertrophy
Anterior Q waves, possibly due to LVH
Borderline T abnormalities, inferior leads
Compared to ECG 2018 12:16:14
No significant change was found
Electronically Signed On 2018 17:26:52 CST by Chuy Rico
[email or web address]
 
 
 
 
 
 
 
 
 
 
 
 
 
 
 
 
 
  <ELECTRONICALLY SIGNED>
   By: Chuy Rico MD, [facility]   
  18     1726
D: 18 1558                           _____________________________________
T: 18 1558                           Chuy Rico MD, [facility]     /EPI

## 2018-12-18 NOTE — NUR
ASSESSMENT AS CHARTED - MEDS A PER MAR -  NO CO'S OF PAIN OR NAUSEA.  PT GIVEN
CLEAR LIQUID DIET AND THEN MADE NPO FOR CATH THIS AFTERNOON.  PT BLOOD SUGAR
NOT COVERED DUE TO NPO STATUS.  PY UP AD BHAVESH IN ROOM.  TO CATH @ 1400.  PT
RETUNRNED FROM CATH - VSS -  SALINE AND INTEGRILLIN RUNNING AS ORDERED.
GROIN SITE STABLE WITH ANGIOSEAL INSITU - PT ABLE TO BE OFF BEDREST @ 1915.
KUSHAL DIET AND FLUIDS.  BROTHER AT THE BEDSIDE - PATIENT AWAKE AFTER PROCEDURE.
NO CO'S AT THE PRESENT

## 2018-12-18 NOTE — EKG
[address]  [address]
Phone:  [phone]                    ELECTROCARDIOGRAM REPORT      
_______________________________________________________________________________
 
Name:       FORTUNATODARRICK       Room #:         200-I       ADM IN  
M.R.#:      [id number]     Account #:      [id number]  
Admission:  18    Attend Phys:    Ruddy Sánchez MD 
Discharge:              Date of Birth:  [birth year]  
                                                          Report #: [id number]
   [id number]
_______________________________________________________________________________
THIS REPORT FOR:   //name//                          
 
                         [facility] ED
                                       
Test Date:    2018               Test Time:    12:16:14
Pat Name:     DARRICK GOODMAN           Department:   
Patient ID:   [id number]            Room:         200
Gender:       M                        Technician:   RODRIGO
:          1950               Requested By: Fortunato Campoverde
Order Number: [id number] MD:   Chuy Rico
                                 Measurements
Intervals                              Axis          
Rate:         70                       P:            -57
[place]:           191                      QRS:          -31
QRSD:         99                       T:            14
QT:           378                                    
QTc:          408                                    
                           Interpretive Statements
Sinus or ectopic atrial rhythm
Left ventricular hypertrophy
Compared to ECG 02/15/2018 06:32:57
No significant change was found
Electronically Signed On 2018 8:12:09 CST by Chuy Rico
[email or web address]
 
 
 
 
 
 
 
 
 
 
 
 
 
 
 
 
 
 
 
  <ELECTRONICALLY SIGNED>
   By: Chuy Rico MD, [facility]   
  12/812
D: 18                           _____________________________________
T: 18                           Chuy Rico MD, [facility]     /EPI

## 2018-12-18 NOTE — NUR
ASSUMED PT CARE AT 1900 WITH BEDSIDE REPORT COMPLETED. NO SIGN OF DISTRESS
NOTED IN PT. PT IS ALERT AND ORIENTED WITH FAMILY AT BEDSIDE. PT DENIES ANY
CHEST PAIN. SCHEDULED MED ADMINISTERED TO PT, PT IS NPO FOR POSSIBLE CARDIAC
CATH. DENIES ANY NEEDS, ASSESSMENT COMPLETED. DENIES ANY FURTHER NEED AT THIS
TIME

## 2018-12-19 VITALS — SYSTOLIC BLOOD PRESSURE: 147 MMHG | DIASTOLIC BLOOD PRESSURE: 75 MMHG

## 2018-12-19 VITALS — DIASTOLIC BLOOD PRESSURE: 70 MMHG | SYSTOLIC BLOOD PRESSURE: 150 MMHG

## 2018-12-19 VITALS — SYSTOLIC BLOOD PRESSURE: 168 MMHG | DIASTOLIC BLOOD PRESSURE: 90 MMHG

## 2018-12-19 VITALS — DIASTOLIC BLOOD PRESSURE: 90 MMHG | SYSTOLIC BLOOD PRESSURE: 168 MMHG

## 2018-12-19 LAB
ANION GAP SERPL CALC-SCNC: 9 MMOL/L (ref 7–16)
BUN SERPL-MCNC: 19 MG/DL (ref 7–18)
CALCIUM SERPL-MCNC: 9.1 MG/DL (ref 8.5–10.1)
CHLORIDE SERPL-SCNC: 102 MMOL/L (ref 98–107)
CO2 SERPL-SCNC: 25 MMOL/L (ref 21–32)
CREAT SERPL-MCNC: 1.7 MG/DL (ref 0.7–1.3)
ERYTHROCYTE [DISTWIDTH] IN BLOOD BY AUTOMATED COUNT: 13.6 % (ref 10.5–14.5)
GLUCOSE SERPL-MCNC: 175 MG/DL (ref 74–106)
HCT VFR BLD CALC: 33.5 % (ref 42–52)
HGB BLD-MCNC: 11.7 GM/DL (ref 14–18)
MAGNESIUM SERPL-MCNC: 1.5 MG/DL (ref 1.8–2.4)
MCH RBC QN AUTO: 29.3 PG (ref 26–34)
MCHC RBC AUTO-ENTMCNC: 34.9 G/DL (ref 28–37)
MCV RBC: 84.1 FL (ref 80–100)
PLATELET # BLD: 228 THOU/UL (ref 150–400)
POTASSIUM SERPL-SCNC: 4 MMOL/L (ref 3.5–5.1)
RBC # BLD AUTO: 3.99 MIL/UL (ref 4.5–6)
SODIUM SERPL-SCNC: 136 MMOL/L (ref 136–145)
WBC # BLD AUTO: 7.5 THOU/UL (ref 4–11)

## 2018-12-19 NOTE — HC
[facility]
[address]
[place]   [address]                     CONSULTATION                  
_______________________________________________________________________________
 
Name:       DARRICK GOODMAN SR      Room #:         200-I       [facility] [place]  
..#:      [id number]                       Account #:      [id number]  
Admission:  12/17/18    Attend Phys:    Ruddy Sánchez MD 
Discharge:  12/19/18    Date of Birth:  [birth year]  
                                                          Report #: [id number]
                                                                    [id number]   
_______________________________________________________________________________
THIS REPORT FOR:   //name//                          
 
CC: Martín Carson MD
 
DATE OF SERVICE:  12/17/2018
 
 
CARDIOLOGY CONSULTATION
 
HISTORY OF PRESENT ILLNESS:  The patient is a 68-year-old single black male who
I was asked to see in the hospital after he complained of chest pain.  The
patient had apparently a balloon angioplasty at [facility]
approximately 10 years ago.  He had his first stent placed in 2014 at [facility] in the circumflex artery by Dr. Mccall.  He had 2 additional
stents placed in the circumflex artery in April 2017.  He was admitted here to
[facility] in February 2018 complaining of chest pain.  I
actually performed repeat cardiac catheterization in February from the right
radial artery; however, because of the abnormal takeoff of the left main artery,
it was decided to perform intervention from the right femoral artery.  There was
a long stent in the circumflex across the takeoff of a large second marginal
branch.  There was 60% restenosis at the distal portion of the stent.  The
marginal branch had an ostial 99% stenosis.  The right coronary had a 60%
stenosis.  Left ventriculography was not performed.  I performed angioplasty of
the second marginal branch.  A single drug-eluting stent was placed in the
marginal branch.  Kissing balloon angioplasty was then performed of the
circumflex and the marginal branch.  There was 0% residual narrowing following
stenting.  He was then discharged.  He notes that since his discharge, he was
actually admitted to [facility] a month later and had additional
stenting.  He did well until the past few weeks.  He has been having recurrent
chest pain.  Describes a burning in his chest.  It has not necessarily related
to any exertion or meals.  He can occasionally make some belch.  He has taken
nitroglycerin that did not seem to help.  He denied associated shortness of
breath, diaphoresis or nausea.  He has had no bleeding.  Denied any cough or
trauma to his chest.  He denies exertional dyspnea, palpitations or syncope.
 
PAST MEDICAL HISTORY:  He has had back surgery, appendectomy, hypertension,
diabetes, hyperlipidemia.
 
MEDICATIONS:  Include aspirin, Lipitor, glipizide, hydrochlorothiazide, Imdur,
lisinopril, metformin, metoprolol, Effient.
 
ALLERGIES:  HE HAS AN INTOLERANCE TO NIACIN.
 
 
 
 
[facility]
[address] [place]   [address]                     CONSULTATION                  
_______________________________________________________________________________
 
Name:       DARRICK GOODMAN       Room #:         200-I       [facility] [place]  
M.R.#:      [id number]                       Account #:      [id number]  
Admission:  12/17/18    Attend Phys:    Ruddy Sánchez MD 
Discharge:  12/19/18    Date of Birth:  [birth year]  
                                                          Report #: [id number]
                                                                    [id number]   
_______________________________________________________________________________
FAMILY HISTORY:  Significant for diabetes.
 
SOCIAL HISTORY:  He is , lives with his son in [place], [place]. 
Retired .  No smoking or alcohol abuse.
 
REVIEW OF SYSTEMS:  He has had no history of stroke, asthma, peptic ulcer
disease, liver disease, kidney disease, cancer, psychiatric illness or chronic
skin condition.
 
PHYSICAL EXAMINATION:
GENERAL:  Revealed a middle-aged black male who appeared in no distress.
VITAL SIGNS:  Blood pressure 120/60, pulse 70, he is afebrile.
HEENT:  He is anicteric.  Conjunctivae pink.  Mucous membranes moist.
NECK:  Neck veins nondistended.  Bilateral carotid bruits were heard.  Neck
supple.
CHEST:  Clear to auscultation.
CARDIOVASCULAR:  Regular rate and rhythm, grade 2 systolic ejection murmur.
ABDOMEN:  Soft.
EXTREMITIES:  Had no edema.  Dorsalis pedis pulse 1+ bilaterally.
SKIN:  Warm, dry.
NEUROLOGIC:  Nonfocal.
LYMPHATIC:  No adenopathy.
MUSCULOSKELETAL:  No joint effusion.
 
RADIOLOGICAL DATA:  ECG shows a sinus rhythm, left ventricular hypertrophy,
repolarization changes.  His workup, he had portable chest x-ray in the
Emergency Room today that showed normal heart size, clear lung fields.
 
LABORATORY DATA:  Sodium 137, potassium 4.3, BUN 22, creatinine was 1.6, it has
been 1.0 in the past.  His liver function studies are normal.  Troponin 0.06. 
His white blood cell count 5.9, hemoglobin 11.8.  He had a hemoglobin of 11.7
back in 2014.
 
IMPRESSION AND RECOMMENDATIONS:
1.  Unstable angina.  Recommend repeat cardiac catheterization.
2.  Hypertension.  The patient has been on a diuretic and ACE inhibitor and beta
blocker.
3.  Diabetes.
4.  Hyperlipidemia.  The patient is on a statin drug addict.
5.  Carotid bruit.  Recommend Doppler.
 
 
 
 
  <ELECTRONICALLY SIGNED>
   By: Martín Diggs MD, FACC      
  12/19/18     1658
D: 12/17/18 1742                           _____________________________________
T: 12/17/18 2214                           Martín Diggs MD, FACC        /nt

## 2018-12-19 NOTE — NUR
ASSUMED PT CARE AT 1900 WITH BEDSIDE REPORT COMPLETED. NO SIGN OF DISTRESS
NOTED IN PT. PT WAS ON BEDREST FROM CARIDAC CATHERIZATION. RIGHT GROIN INTACT,
NO BLEEDING OR HEMATOMA NOTICED UPON ASSESSMENT. PT DENIES GROIN AND CHEST
PAIN. BLOOD SUGAR CHECKED, SCHEDULED MEDICATION ADMINISTERED TO PT, DENIES ANY
FURTHER NEEDS AT THIS TIME.

## 2018-12-19 NOTE — EKG
[place] [address] [address] [facility] [employer]
[place]  [address]
Phone:  [phone]                    ELECTROCARDIOGRAM REPORT      
_______________________________________________________________________________
 
Name:       DARRICK GOODMAN       Room #:         200-I       ADM IN  
M.R.#:      [id number]     Account #:      [id number]  
Admission:  18    Attend Phys:    Ruddy Sánchez MD 
Discharge:              Date of Birth:  [birth year]  
                                                          Report #: [id number]
   [id number]
_______________________________________________________________________________
THIS REPORT FOR:   //name//                          
 
                          [facility]
                                       
Test Date:    2018               Test Time:    07:18:48
Pat Name:     DARRICK GOODMAN           Department:   
Patient ID:   [id number]            Room:         200 I
Gender:       M                        Technician:   
:          1950               Requested By: Martín Diggs
Order Number: [id number] MD:   Chuy Rico
                                 Measurements
Intervals                              Axis          
Rate:         70                       P:            53
CT:           182                      QRS:          -28
QRSD:         98                       T:            18
QT:           384                                    
QTc:          415                                    
                           Interpretive Statements
Sinus rhythm
Left ventricular hypertrophy
Anterior Q waves, possibly due to LVH
Borderline T abnormalities
Compared to ECG 2018 15:58:20
No significant changes
 
Electronically Signed On 2018 7:59:10 CST by Chuy Rico
[email or web address]
 
 
 
 
 
 
 
 
 
 
 
 
 
 
 
 
  <ELECTRONICALLY SIGNED>
   By: Chuy Rico MD, [facility]   
  18     0759
D: 18                           _____________________________________
T: 18                           Chuy Rico MD, [facility]     /EPI

## 2019-01-14 NOTE — EKG
[place] [address]
Phone:  [phone]                     ELECTROCARDIOGRAM REPORT      
_______________________________________________________________________________
 
Name:       AMBER KIRAN SR            Room:           [address] 
M.R.#:  [id number]      Account #:      [id number]  
Admission:  19     Attend Phys:    Jerald June MD, 
Discharge:               Date of Birth:  [birth year]  
         Report #: [id number]
    [id number]
_______________________________________________________________________________
THIS REPORT FOR:  //name//                      
 
                          [facility]
                                       
Test Date:    2019               Test Time:    11:23:13
Pat Name:     AMBER KIRAN           Department:   
Patient ID:   [id number]            Room:         [facility]
Gender:       M                        Technician:   
:          1950               Requested By: Jerald June
Order Number: [id number]    Harpal MD:   Jerald June
                                 Measurements
Intervals                              Axis          
Rate:         56                       P:            46
UT:           203                      QRS:          -28
QRSD:         97                       T:            -29
QT:           406                                    
QTc:          392                                    
                           Interpretive Statements
Sinus rhythm
Left ventricular hypertrophy
Borderline T abnormalities, inferior leads
Compared to ECG 2018 08:47:57
No significant changes
 
Electronically Signed On 2019 15:38:36 CST by Jerald June
[email or web address]
 
 
 
 
 
 
 
 
 
 
 
 
 
 
 
 
 
  <ELECTRONICALLY SIGNED>
                                           By: Jerald June MD, [facility]     
  19     1538
D: 19 1123   _____________________________________
T: 19 1123   Jerald June MD, [facility]       /EPI

## 2019-01-14 NOTE — EKG
[place] [address]
Phone:  [phone]                     ELECTROCARDIOGRAM REPORT      
_______________________________________________________________________________
 
Name:       AMBER KIRAN SR            Room:           [address] 
M.R.#:  [id number]      Account #:      [id number]  
Admission:  19     Attend Phys:    Jerald June MD, 
Discharge:               Date of Birth:  [birth year]  
         Report #: [id number]
    [id number]
_______________________________________________________________________________
THIS REPORT FOR:  //name//                      
 
                          [facility]
                                       
Test Date:    2019               Test Time:    09:03:09
Pat Name:     AMBER KIRAN           Department:   
Patient ID:   [id number]            Room:         [facility]
Gender:       M                        Technician:   
:          1950               Requested By: Jerald June
Order Number: [id number]    Harpal MD:   Jerald June
                                 Measurements
Intervals                              Axis          
Rate:         61                       P:            47
RI:           198                      QRS:          -32
QRSD:         99                       T:            -4
QT:           395                                    
QTc:          398                                    
                           Interpretive Statements
Sinus rhythm
Left ventricular hypertrophy
Borderline T abnormalities, inferior leads
Compared to ECG 2018 08:47:57
No significant changes
 
Electronically Signed On 2019 15:37:32 CST by Jerald June
[email or web address]
 
 
 
 
 
 
 
 
 
 
 
 
 
 
 
 
 
  <ELECTRONICALLY SIGNED>
                                           By: Jerald June MD, [facility]     
  19     1537
D: 19   _____________________________________
T: 19   Jerald June MD, [facility]       /EPI

## 2019-01-14 NOTE — H
[address]  [address]                    HISTORY AND PHYSICAL          
_______________________________________________________________________________
 
Name:       AMEBR KIRAN             Room:           [address] [place] 
MYadiraRYadira#:  [id number]      Account #:      [id number]  
Admission:  01/14/19     Attend Phys:    Jerald June MD, 
Discharge:  01/15/19     Date of Birth:  [birth year]  
         Report #: [id number]
                                                                                
_______________________________________________________________________________
THIS REPORT FOR:  //name//                      
 
For History and Physical please refer to the previous consultation note in the
Per[facility] content.
 
 
 
 
 
 
 
 
 
 
 
 
 
 
 
 
 
 
 
 
 
 
 
 
 
 
 
 
 
 
 
 
 
 
 
 
 
 
 
 
                       
                                        By:                                
                 
D: 01/14/19     _______________________________________
T: 01/17/19 0644Medical Records Staff HINA       /AL

## 2019-01-14 NOTE — NUR
PT ADMITTED ON TELEMETRY FLOOR AT 1100 AM FROM CATH LAB. PT IS AOX4. SR  TO S
CARMEL ON MONITOR. PT ON BEDREST AT THIS TIME. NURSE MONITORING VS Q15 MINUTES
PER POST CARDIAC CATH PROTOCOL. SON IN ROOM WITH PT. PT STATES HE LIVES WITH
HIS SON AND THAT HE HAD TAKEN ALL HIS MORNING MEDICATIONS THIS AM. SO
MEDICATIONS NOT GIVEN DUE TO AVOIDANCE OF DUPLICATION. IV FLUID INFUSING AT
125 PER HOUR IN [facility]. RIGHT GROIN AREA IS INTACT, SO SWELLING, BLEEDING OR
PAIN TO TOUCH. EKG DONE AT BEDSIDE SHOWS SR. ADMISSION ASSESSEMENT AND HX
PERFORMED AT BEDSIDE. WILL CONTINUE TO MONITOR PT.

## 2019-01-14 NOTE — CARD
[address]  [address]                    CARDIAC CATH REPORT           
_______________________________________________________________________________
 
Name:       QIANAAMBER SR            Room:           [address] [place] HARDING#:  [id number]      Account #:      [id number]  
Admission:  01/14/19     Attend Phys:    Jerald June MD, 
Discharge:               Date of Birth:  [birth year]  
         Report #: [id number]
                                                                     [id number]
_______________________________________________________________________________
THIS REPORT FOR:  //name//                      
 
 
--------------- APPROVED REPORT --------------
 
 
Study performed:  01/14/2019 08:16:15
 
Patient Details
The patient is a 68 year-old male
 
Event Personnel
Jerald June  Cardiologist, Lyndsey Eason RN RN, Javier Hutchison, Eligio Chu  Scrub
 
Procedures Performed
Left heart catheterization selective coronary artery and percutaneous 
coronary intervention to the right coronary 
artery
 
Indication
Unstable angina 
 
Risk Factors
Hypercholesterolemia, Hypertension
 
Previous Procedures/Diagnoses
Previous PCI, Previous MI
 
Admission/Lab Medications/Medications given during procedure
Aspirin, Platelet Aff. Inhib., Angiomax bolus and infusion
 
Procedure Narrative
The patient was brought electively to the Cardiac Catheterization 
Laboratory and was prepped and draped in a sterile manner. The right 
femoral was infiltrated with 2% Lidocaine subcutaneous anesthesia. A 
[place] 6 FR sheath was inserted into the . Coronary angiography was 
performed using coronary diagnostic catheters. The right coronary 
system was accessed and visualized with a Diagnostic - 3DRC catheter. 
The left coronary system was accessed and visualized with a 
Diagnostic - JL4 catheter. The left ventricle was accessed and 
visualized with a Diagnostic - PIG catheter. Left ventricular/Aortic 
Valve gradient assessed via catheter pullback. Pre-demployment 
femoral angiogram was performed . The patient tolerated the procedure 
well and there were no complications associated with the procedure. 
 
 
 
 
[place] [address]                    CARDIAC CATH REPORT           
_______________________________________________________________________________
 
Name:       AMBER KIRAN SR            Room:           [address] [place] H[place]#:  [id number]      Account #:      [id number]  
Admission:  01/14/19     Attend Phys:    Jerald June MD, 
Discharge:               Date of Birth:  [birth year]  
         Report #: [id number]
                                                                     [id number]
_______________________________________________________________________________
Intraoperative Conscious Sedation
Sedation start time:  0924           Case end Time:  
1033    
Fentanyl  25 mcg    Versed  2 mg  
 
Fluoro Time:    17.4 minutes     
Dose:     DAP 24446 cGycm2  1463 mGy  
Contrast Type and Amount:  Visipaque 230 ml    
 
Coronary Angiography
The patient's coronary anatomy is right dominant. 
 
Diagnostic Cath
Left Main 0% narrowing
LAD  30% proximal and mid LAD narrowing
Circumflex 40% proximal circumflex narrowing with a widely patent mid 
circumflex stent and widely patent second marginal stent with 40% 
narrowing of the second marginal beyond the stented portion
Right Coronary Moderate size dominant vessel with 90% irregular 
tortuous mid vessel stenosis and 40% distal narrowing
 
Left Ventriculography
Left Ventriculography was not performed.     
 
Hemodynamics
The aortic pressure is 122/52 mmHg with a mean of 78 mmHg. The left 
ventricular pressure is 124/-1 mmHg with a mean of mmHg. The left 
ventricular end diastolic pressure is 6 mmHg. There was no gradient 
across the aortic valve upon pullback. 
 
PCI Technique Lesion
Anticoagulation was achieved with Angiomax. Patient was preloaded 
with Angiomax IV 10.5 mg per kg. Percutaneous coronary intervention 
was performed on the mid right coronary artery. The lesion stenosis 
prior to intervention was 90% with LINDA 3 flow. A 6FR 3DRC SH Guide 
Catheter was used to engage the ostium. A IG: ProwaterFlex 180CM 
Interventional Guidewire was used to cross the lesion.
 
BALLOON DILATION
A Balloon catheter Mini Trek RX 2.0 X 12 was inserted and inflated up 
to 12.00atm for 15seconds. Additional Inflation: 12.00atm for 
12seconds.
 
STENT DEPLOYMENT
A stent Vincent RX Stent  2.0X30mm was inserted and inflated up to 
12.00atm for 19seconds. Additional Inflation: 18.00atm for 
 
 
 
[place] [address]                    CARDIAC CATH REPORT           
_______________________________________________________________________________
 
Name:       AMBER KIRAN SR            Room:           [address]#:  [id number]      Account #:      [id number]  
Admission:  01/14/19     Attend Phys:    Jerald June MD, 
Discharge:               Date of Birth:  [birth year]  
         Report #: [id number]
                                                                     [id number]
_______________________________________________________________________________
18seconds.
 
POST STENT DEPLOYMENT BALLOON DILATION
A Balloon catheter NC Trek RX 2.25x12 was inserted and inflated up to 
12.00atm for 10seconds. Additional Inflation: 16.00atm for 9seconds. 
Additional Inflation: 18.00atm for 9seconds. 20 MISHA FOR 11 SEC 22 MISHA 
FOR 8 SEC 18 IMSHA FOR 11 SEC
 
Final angiography reveals 0 % stenosis with LINDA 3 
flow.
 
Conclusion
#1 significant coronary artery disease characterized by the 
following:
 
A 30% proximal and mid LAD narrowing
 
B 40% proximal circumflex narrowing with 40% narrowing of the second 
marginal branch beyond the stented portion
 
C modest sized dominant right coronary artery with 90% tortuous 
irregular mid vessel stenosis
 
#2 normal left-sided hemodynamics study
 
#3 successful percutaneous coronary intervention with deployment of 
drug-eluting stent at site of 90% mid right coronary stenosis with 0% 
residual narrowing and LINDA-3 flow to the distal vessel 
 
Recommendations
Cardiac Risk Reduction Program
Aggressive Medical Therapy
 
Medications Administered
Ticagrelor 
 
Diagnostic Cath Approved by: Jerald June MD        Date/Time: 
01/14/2019 16:26:09
 
 
 
 
 
 
<ELECTRONICALLY SIGNED>
                                        By:  Jerald June MD, [facility]     
01/14/19     1626
D: 01/14/19 1626_______________________________________
T: 01/14/19 1626Jerald June MD, FACC        /INF

## 2019-01-15 NOTE — EKG
[place] [address]
Phone:  [phone]                     ELECTROCARDIOGRAM REPORT      
_______________________________________________________________________________
 
Name:       AMBER KIRAN             Room:           [address]#:  [id number]      Account #:      [id number]  
Admission:  19     Attend Phys:    Jerald June MD, 
Discharge:  01/15/19     Date of Birth:  [birth year]  
         Report #: [id number]
    [id number]
_______________________________________________________________________________
THIS REPORT FOR:  //name//                      
 
                          [facility]
                                       
Test Date:    2019-01-15               Test Time:    04:55:23
Pat Name:     AMBER KIRAN           Department:   
Patient ID:   [id number]            Room:         [facility]
Gender:       M                        Technician:   
:          1950               Requested By: Jerald June
Order Number: [id number]    Harpal MD:   Jerald June
                                 Measurements
Intervals                              Axis          
Rate:         70                       P:            47
CA:           182                      QRS:          -26
QRSD:         100                      T:            21
QT:           386                                    
QTc:          417                                    
                           Interpretive Statements
Sinus rhythm
Left ventricular hypertrophy
Compared to ECG 2019 11:23:13
T-wave abnormality no longer present
 
Electronically Signed On 1- 16:25:07 CST by Jerald June
[email or web address]
 
 
 
 
 
 
 
 
 
 
 
 
 
 
 
 
 
 
  <ELECTRONICALLY SIGNED>
                                           By: Jerald June MD, [facility]     
  01/15/19     1625
D: 01/15/19 0455   _____________________________________
T: 01/15/19 0455   Jerald June MD, FACC       /EPI

## 2019-01-15 NOTE — NUR
RECIEVED REPORT AND ASSUMED CARE AT 1900. CARDIAC MONITOR IN PLACE AND VITAL
SIGNS ARE STABLE. PT DENIED ANY PAIN AT THIS TIME. ASSESSMENT COMPLETED,
DISCUSSED PLAN OF CARE AND PT UNDERSTANDS. PT UP WITH STAND BY ASSIST THE
FIRST TIME THEN UP ADLIB. BED LOCKED AND CALL LIGHT WITHIN REACH. FALL
PRECAUTIONS IN PLACE. HOURLY ROUNDING DONE AND ALL NEEDS MET. NURSING WILL
CONTINUE TO MONITOR.

## 2019-01-15 NOTE — NUR
DC ORDERS RECEIVED.  IV AND MONITOR REMOVED.  PT. GIVEN DC INSTRUCTIONS,
SCRIPTS, AND CARENOTES.  PT. SON AT BEDSIDE FOR DISCHARGE.

## 2019-01-15 NOTE — NUR
ASSUMED PT. CARE AND RECEIVED REPORT AT 0730. PT A/OX4,VSS, MONITOR ON TRACING
SR.  PT. DENIES CURRENT PAIN/SOB.  FULL ASSESSMENT COMPLETED, REFER TO
CHARTING.  RIGHT GROIN DRESSING C/D/I WITH 2+ PULSES NOTED.  NO HEMATOMA OR
BLEEDING PRESENT.  HERNANDO TOLENTINO CASE INTO SEE PT. THIS MORNING.  SAMPLES OF
BRILINTA GIVEN FOR DC.  CALL LIGHT IN REACH, WILL CONTINUE WITH PLAN OF CARE.

## 2019-01-17 NOTE — D
[address]  [address]                    DISCHARGE SUMMARY             
_______________________________________________________________________________
 
Name:       AMBER KIRAN SR            Room:           [address] [place]#:  [id number]      Account #:      [id number]  
Admission:  01/14/19     Attend Phys:    Jerald June MD, 
Discharge:  01/15/19     Date of Birth:  [birth year]  
         Report #: [id number]
                                                                     [id number]  
_______________________________________________________________________________
THIS REPORT FOR:  //name//                      
 
[facility]
 
DATE OF SERVICE:  01/15/2019
 
 
FINAL DISCHARGE DIAGNOSES:
1.  Unstable angina.
2.  Coronary artery disease.
3.  Status post percutaneous coronary intervention of the right coronary artery.
4.  Hypertension.
5.  Hyperlipidemia.
6.  Type 2 diabetes.
 
PROCEDURES:  01/14/2019 -- left heart catheterization, selective coronary
arteriography, percutaneous coronary intervention of the right coronary artery.
 
The patient is a very pleasant 68-year-old male with complex coronary artery
disease, status post prior myocardial infarction and prior stenting.  He
recently underwent angioplasty of an area of stent thrombosis in the marginal
branch of the circumflex.  He was also noted to have concomitant right coronary
disease, which was not approached in the acute setting.  He has continued to
experience some angina and was recatheterized on 01/14/2019.  That study
revealed 40% proximal circumflex narrowing with 40% narrowing of the second
marginal after the previously deployed stent.  There was 30% proximal mid LAD
narrowing.  There was 90% irregular eccentric mid right coronary stenosis.  I
deployed one 2.0 x 30 mm Prentiss drug-eluting stent in the right coronary artery,
post-dilated to 2.3 mm with 0% residual narrowing and LINDA 3 flow of the distal
vessel.
 
The patient did well post-procedurally, and there was good hemostasis at the
right femoral site of catheterization.
 
LABORATORY DATA:  On 01/15 revealed sodium 142, potassium 4.1.  BUN 21, down
from 25.  Creatinine 1.8, down from 1.9.  Glucose 114 mg percent.  Hemoglobin
10.6, hematocrit 30.1, white blood cell count 6900, with 198,000 platelets. 
Troponin 0.15.  Total cholesterol 98, triglycerides 185, HDL 30, LDL 31.
 
The patient ambulated in the hallways without difficulty.  He was discharged to
home on the following medications:  Vitamin C 1000 mg daily, aspirin 81 mg
daily, atorvastatin 20 mg at bedtime, cholecalciferol D3 of 2000 units daily,
ferrous sulfate 325 mg daily, glipizide 10 mg b.i.d., hydrochlorothiazide 12.5
mg daily, Imdur 30 mg daily, lisinopril 20 mg b.i.d.  Metformin 2500 mg daily,
to be resumed on 01/16/2019.  Metoprolol tartrate 50 mg daily, ticagrelor or
 
 
 
[place] [address]                    DISCHARGE SUMMARY             
_______________________________________________________________________________
 
Name:       AMBER KIRAN SR            Room:           [place] [address]    DREW HARD[place]#:  [id number]      Account #:      [id number]  
Admission:  01/14/19     Attend Phys:    Jerald June MD, 
Discharge:  01/15/19     Date of Birth:  [birth year]  
         Report #: [id number]
                                                                     [id number]  
_______________________________________________________________________________
Brilinta 90 mg b.i.d. and CoQ10 of 10 mg capsule 1 daily.
 
I will plan to see the patient in followup on 02/05/2019 at 1330 hours at [facility].
 
Thus, the patient is discharged to home on the aforementioned medications with
followup as iterated above.
 
 
 
 
 
 
 
 
 
 
 
 
 
 
 
 
 
 
 
 
 
 
 
 
 
 
 
 
 
 
 
 
 
 
 
 
 
<ELECTRONICALLY SIGNED>
                                        By:  Jerald June MD, [facility]     
01/17/19     1830
D: 01/15/19 0915_______________________________________
T: 01/15/19 1022Joshalonda June MD, [facility]        /nt

## 2019-03-17 ENCOUNTER — HOSPITAL ENCOUNTER (EMERGENCY)
Dept: HOSPITAL 35 - ER | Age: 69
Discharge: HOME | End: 2019-03-17
Payer: COMMERCIAL

## 2019-03-17 VITALS — DIASTOLIC BLOOD PRESSURE: 75 MMHG | SYSTOLIC BLOOD PRESSURE: 133 MMHG

## 2019-03-17 VITALS — WEIGHT: 145 LBS | HEIGHT: 63 IN | BODY MASS INDEX: 25.69 KG/M2

## 2019-03-17 DIAGNOSIS — E78.5: ICD-10-CM

## 2019-03-17 DIAGNOSIS — Z88.8: ICD-10-CM

## 2019-03-17 DIAGNOSIS — E11.9: ICD-10-CM

## 2019-03-17 DIAGNOSIS — R07.89: Primary | ICD-10-CM

## 2019-03-17 DIAGNOSIS — I10: ICD-10-CM

## 2019-03-17 DIAGNOSIS — Z95.5: ICD-10-CM

## 2019-03-17 LAB
ANION GAP SERPL CALC-SCNC: 13 MMOL/L (ref 7–16)
BASOPHILS NFR BLD AUTO: 0.9 % (ref 0–2)
BUN SERPL-MCNC: 48 MG/DL (ref 7–18)
CALCIUM SERPL-MCNC: 9.7 MG/DL (ref 8.5–10.1)
CHLORIDE SERPL-SCNC: 101 MMOL/L (ref 98–107)
CO2 SERPL-SCNC: 22 MMOL/L (ref 21–32)
CREAT SERPL-MCNC: 2.3 MG/DL (ref 0.7–1.3)
EOSINOPHIL NFR BLD: 6.6 % (ref 0–3)
ERYTHROCYTE [DISTWIDTH] IN BLOOD BY AUTOMATED COUNT: 13.8 % (ref 10.5–14.5)
GLUCOSE SERPL-MCNC: 323 MG/DL (ref 74–106)
GRANULOCYTES NFR BLD MANUAL: 51.8 % (ref 36–66)
HCT VFR BLD CALC: 33 % (ref 42–52)
HGB BLD-MCNC: 11.5 GM/DL (ref 14–18)
LYMPHOCYTES NFR BLD AUTO: 33.2 % (ref 24–44)
MCH RBC QN AUTO: 29.3 PG (ref 26–34)
MCHC RBC AUTO-ENTMCNC: 34.8 G/DL (ref 28–37)
MCV RBC: 84.2 FL (ref 80–100)
MONOCYTES NFR BLD: 7.5 % (ref 1–8)
NEUTROPHILS # BLD: 3.6 THOU/UL (ref 1.4–8.2)
PLATELET # BLD: 269 THOU/UL (ref 150–400)
POTASSIUM SERPL-SCNC: 4.5 MMOL/L (ref 3.5–5.1)
RBC # BLD AUTO: 3.92 MIL/UL (ref 4.5–6)
SODIUM SERPL-SCNC: 136 MMOL/L (ref 136–145)
TROPONIN I SERPL-MCNC: <0.06 NG/ML (ref ?–0.06)
WBC # BLD AUTO: 7 THOU/UL (ref 4–11)

## 2019-04-04 ENCOUNTER — HOSPITAL ENCOUNTER (OUTPATIENT)
Dept: HOSPITAL 96 - M.LAB | Age: 69
End: 2019-04-04
Attending: NURSE PRACTITIONER
Payer: COMMERCIAL

## 2019-04-04 DIAGNOSIS — N28.9: Primary | ICD-10-CM

## 2019-04-04 LAB
ANION GAP SERPL CALC-SCNC: 11 MMOL/L (ref 7–16)
BUN SERPL-MCNC: 32 MG/DL (ref 7–18)
CALCIUM SERPL-MCNC: 9.3 MG/DL (ref 8.5–10.1)
CHLORIDE SERPL-SCNC: 106 MMOL/L (ref 98–107)
CO2 SERPL-SCNC: 22 MMOL/L (ref 21–32)
CREAT SERPL-MCNC: 2.4 MG/DL (ref 0.6–1.3)
GLUCOSE SERPL-MCNC: 245 MG/DL (ref 70–99)
POTASSIUM SERPL-SCNC: 4.5 MMOL/L (ref 3.5–5.1)
SODIUM SERPL-SCNC: 139 MMOL/L (ref 136–145)

## 2019-04-23 ENCOUNTER — HOSPITAL ENCOUNTER (INPATIENT)
Dept: HOSPITAL 35 - ER | Age: 69
Discharge: HOME | DRG: 303 | End: 2019-04-23
Attending: HOSPITALIST | Admitting: HOSPITALIST
Payer: COMMERCIAL

## 2019-04-23 VITALS — DIASTOLIC BLOOD PRESSURE: 72 MMHG | SYSTOLIC BLOOD PRESSURE: 141 MMHG

## 2019-04-23 VITALS — DIASTOLIC BLOOD PRESSURE: 81 MMHG | SYSTOLIC BLOOD PRESSURE: 149 MMHG

## 2019-04-23 VITALS — SYSTOLIC BLOOD PRESSURE: 137 MMHG | DIASTOLIC BLOOD PRESSURE: 66 MMHG

## 2019-04-23 VITALS — SYSTOLIC BLOOD PRESSURE: 136 MMHG | DIASTOLIC BLOOD PRESSURE: 74 MMHG

## 2019-04-23 VITALS — SYSTOLIC BLOOD PRESSURE: 147 MMHG | DIASTOLIC BLOOD PRESSURE: 77 MMHG

## 2019-04-23 VITALS — HEIGHT: 67 IN | BODY MASS INDEX: 25.11 KG/M2 | WEIGHT: 160 LBS

## 2019-04-23 VITALS — SYSTOLIC BLOOD PRESSURE: 128 MMHG | DIASTOLIC BLOOD PRESSURE: 74 MMHG

## 2019-04-23 DIAGNOSIS — I12.9: ICD-10-CM

## 2019-04-23 DIAGNOSIS — Z79.02: ICD-10-CM

## 2019-04-23 DIAGNOSIS — I25.2: ICD-10-CM

## 2019-04-23 DIAGNOSIS — Z95.5: ICD-10-CM

## 2019-04-23 DIAGNOSIS — Z88.8: ICD-10-CM

## 2019-04-23 DIAGNOSIS — E11.22: ICD-10-CM

## 2019-04-23 DIAGNOSIS — N17.9: ICD-10-CM

## 2019-04-23 DIAGNOSIS — E11.42: ICD-10-CM

## 2019-04-23 DIAGNOSIS — Z90.49: ICD-10-CM

## 2019-04-23 DIAGNOSIS — N18.9: ICD-10-CM

## 2019-04-23 DIAGNOSIS — E78.5: ICD-10-CM

## 2019-04-23 DIAGNOSIS — I25.119: Primary | ICD-10-CM

## 2019-04-23 DIAGNOSIS — Z79.84: ICD-10-CM

## 2019-04-23 DIAGNOSIS — Z79.899: ICD-10-CM

## 2019-04-23 LAB
ANION GAP SERPL CALC-SCNC: 13 MMOL/L (ref 7–16)
APTT BLD: 26.6 SECONDS (ref 24.5–32.8)
BASOPHILS NFR BLD AUTO: 0.1 % (ref 0–2)
BUN SERPL-MCNC: 31 MG/DL (ref 7–18)
CALCIUM SERPL-MCNC: 10.1 MG/DL (ref 8.5–10.1)
CHLORIDE SERPL-SCNC: 106 MMOL/L (ref 98–107)
CO2 SERPL-SCNC: 23 MMOL/L (ref 21–32)
CREAT SERPL-MCNC: 2.3 MG/DL (ref 0.7–1.3)
EOSINOPHIL NFR BLD: 7.7 % (ref 0–3)
ERYTHROCYTE [DISTWIDTH] IN BLOOD BY AUTOMATED COUNT: 13.9 % (ref 10.5–14.5)
GLUCOSE SERPL-MCNC: 170 MG/DL (ref 74–106)
GRANULOCYTES NFR BLD MANUAL: 49.3 % (ref 36–66)
HCT VFR BLD CALC: 29 % (ref 42–52)
HGB BLD-MCNC: 10.2 GM/DL (ref 14–18)
INR PPP: 1
LYMPHOCYTES NFR BLD AUTO: 32.2 % (ref 24–44)
MCH RBC QN AUTO: 29.3 PG (ref 26–34)
MCHC RBC AUTO-ENTMCNC: 35.1 G/DL (ref 28–37)
MCV RBC: 83.4 FL (ref 80–100)
MONOCYTES NFR BLD: 10.7 % (ref 1–8)
NEUTROPHILS # BLD: 3.6 THOU/UL (ref 1.4–8.2)
PLATELET # BLD: 263 THOU/UL (ref 150–400)
POTASSIUM SERPL-SCNC: 4.3 MMOL/L (ref 3.5–5.1)
PROT/CREAT UR-RTO: 0.3
PROTHROMBIN TIME: 10.5 SECONDS (ref 9.3–11.4)
RBC # BLD AUTO: 3.48 MIL/UL (ref 4.5–6)
SODIUM SERPL-SCNC: 142 MMOL/L (ref 136–145)
TROPONIN I SERPL-MCNC: 0.09 NG/ML (ref ?–0.06)
URINE CREATININE-RANDOM*: 42.5 MG/DL
URINE PROTEIN-RANDOM*: 14.3 MG/DL (ref ?–11.9)
WBC # BLD AUTO: 7.4 THOU/UL (ref 4–11)

## 2019-04-23 PROCEDURE — 10081 I&D PILONIDAL CYST COMP: CPT

## 2019-04-24 ENCOUNTER — HOSPITAL ENCOUNTER (INPATIENT)
Dept: HOSPITAL 96 - M.ERS | Age: 69
LOS: 3 days | Discharge: HOME | DRG: 247 | End: 2019-04-27
Attending: INTERNAL MEDICINE | Admitting: INTERNAL MEDICINE
Payer: MEDICARE

## 2019-04-24 VITALS — SYSTOLIC BLOOD PRESSURE: 146 MMHG | DIASTOLIC BLOOD PRESSURE: 71 MMHG

## 2019-04-24 VITALS — DIASTOLIC BLOOD PRESSURE: 75 MMHG | SYSTOLIC BLOOD PRESSURE: 131 MMHG

## 2019-04-24 VITALS — SYSTOLIC BLOOD PRESSURE: 145 MMHG | DIASTOLIC BLOOD PRESSURE: 78 MMHG

## 2019-04-24 VITALS — BODY MASS INDEX: 25.69 KG/M2 | WEIGHT: 145 LBS | HEIGHT: 62.99 IN

## 2019-04-24 DIAGNOSIS — E11.65: ICD-10-CM

## 2019-04-24 DIAGNOSIS — Z88.1: ICD-10-CM

## 2019-04-24 DIAGNOSIS — E11.22: ICD-10-CM

## 2019-04-24 DIAGNOSIS — N18.9: ICD-10-CM

## 2019-04-24 DIAGNOSIS — Z95.5: ICD-10-CM

## 2019-04-24 DIAGNOSIS — I12.9: ICD-10-CM

## 2019-04-24 DIAGNOSIS — E78.5: ICD-10-CM

## 2019-04-24 DIAGNOSIS — D64.9: ICD-10-CM

## 2019-04-24 DIAGNOSIS — Z90.49: ICD-10-CM

## 2019-04-24 DIAGNOSIS — I25.110: Primary | ICD-10-CM

## 2019-04-24 LAB
ABSOLUTE BASOPHILS: 0.1 THOU/UL (ref 0–0.2)
ABSOLUTE EOSINOPHILS: 0.4 THOU/UL (ref 0–0.7)
ABSOLUTE MONOCYTES: 0.7 THOU/UL (ref 0–1.2)
ALBUMIN SERPL-MCNC: 3.7 G/DL (ref 3.4–5)
ALP SERPL-CCNC: 70 U/L (ref 46–116)
ALT SERPL-CCNC: 10 U/L (ref 30–65)
ANION GAP SERPL CALC-SCNC: 11 MMOL/L (ref 7–16)
AST SERPL-CCNC: < 5 U/L (ref 15–37)
BASOPHILS NFR BLD AUTO: 1.2 %
BILIRUB SERPL-MCNC: 0.3 MG/DL
BUN SERPL-MCNC: 33 MG/DL (ref 7–18)
CALCIUM SERPL-MCNC: 9 MG/DL (ref 8.5–10.1)
CHLORIDE SERPL-SCNC: 102 MMOL/L (ref 98–107)
CO2 SERPL-SCNC: 24 MMOL/L (ref 21–32)
CREAT SERPL-MCNC: 2.3 MG/DL (ref 0.6–1.3)
EOSINOPHIL NFR BLD: 6.1 %
GLUCOSE SERPL-MCNC: 201 MG/DL (ref 70–99)
GRANULOCYTES NFR BLD MANUAL: 48.9 %
HCT VFR BLD CALC: 28.1 % (ref 42–52)
HGB BLD-MCNC: 10 GM/DL (ref 14–18)
KAPPA LC SERPL-MCNC: 42.7 MG/L (ref 3.3–19.4)
KAPPA LC/LAMBDA SER: 1.65 {RATIO} (ref 0.26–1.65)
LAMBDA LC FREE SERPL-MCNC: 25.9 MG/L (ref 5.7–26.3)
LYMPHOCYTES # BLD: 2.1 THOU/UL (ref 0.8–5.3)
LYMPHOCYTES NFR BLD AUTO: 32.9 %
MCH RBC QN AUTO: 29.5 PG (ref 26–34)
MCHC RBC AUTO-ENTMCNC: 35.5 G/DL (ref 28–37)
MCV RBC: 83 FL (ref 80–100)
MONOCYTES NFR BLD: 10.9 %
MPV: 7.7 FL. (ref 7.2–11.1)
NEUTROPHILS # BLD: 3.2 THOU/UL (ref 1.6–8.1)
NUCLEATED RBCS: 0 /100WBC
PLATELET COUNT*: 258 THOU/UL (ref 150–400)
POTASSIUM SERPL-SCNC: 4.2 MMOL/L (ref 3.5–5.1)
PROT SERPL-MCNC: 7.3 G/DL (ref 6.4–8.2)
RBC # BLD AUTO: 3.38 MIL/UL (ref 4.5–6)
RDW-CV: 13.8 % (ref 10.5–14.5)
SODIUM SERPL-SCNC: 137 MMOL/L (ref 136–145)
WBC # BLD AUTO: 6.5 THOU/UL (ref 4–11)

## 2019-04-25 VITALS — DIASTOLIC BLOOD PRESSURE: 62 MMHG | SYSTOLIC BLOOD PRESSURE: 119 MMHG

## 2019-04-25 VITALS — DIASTOLIC BLOOD PRESSURE: 71 MMHG | SYSTOLIC BLOOD PRESSURE: 121 MMHG

## 2019-04-25 VITALS — SYSTOLIC BLOOD PRESSURE: 132 MMHG | DIASTOLIC BLOOD PRESSURE: 75 MMHG

## 2019-04-25 VITALS — DIASTOLIC BLOOD PRESSURE: 71 MMHG | SYSTOLIC BLOOD PRESSURE: 132 MMHG

## 2019-04-25 VITALS — DIASTOLIC BLOOD PRESSURE: 63 MMHG | SYSTOLIC BLOOD PRESSURE: 122 MMHG

## 2019-04-25 VITALS — DIASTOLIC BLOOD PRESSURE: 64 MMHG | SYSTOLIC BLOOD PRESSURE: 119 MMHG

## 2019-04-25 LAB
ANION GAP SERPL CALC-SCNC: 11 MMOL/L (ref 7–16)
BUN SERPL-MCNC: 31 MG/DL (ref 7–18)
CALCIUM SERPL-MCNC: 8.9 MG/DL (ref 8.5–10.1)
CHLORIDE SERPL-SCNC: 105 MMOL/L (ref 98–107)
CO2 SERPL-SCNC: 25 MMOL/L (ref 21–32)
CREAT SERPL-MCNC: 2.1 MG/DL (ref 0.6–1.3)
GLOBULIN TOTAL: 3.1 G/DL (ref 2.2–3.9)
GLUCOSE SERPL-MCNC: 160 MG/DL (ref 70–99)
HCT VFR BLD CALC: 26.3 % (ref 42–52)
HGB BLD-MCNC: 9.2 GM/DL (ref 14–18)
MAGNESIUM SERPL-MCNC: 1.7 MG/DL (ref 1.8–2.4)
MCH RBC QN AUTO: 29.1 PG (ref 26–34)
MCHC RBC AUTO-ENTMCNC: 35.1 G/DL (ref 28–37)
MCV RBC: 82.8 FL (ref 80–100)
MPV: 7.7 FL. (ref 7.2–11.1)
PLATELET COUNT*: 245 THOU/UL (ref 150–400)
POTASSIUM SERPL-SCNC: 4.5 MMOL/L (ref 3.5–5.1)
RBC # BLD AUTO: 3.18 MIL/UL (ref 4.5–6)
RDW-CV: 13.9 % (ref 10.5–14.5)
SODIUM SERPL-SCNC: 141 MMOL/L (ref 136–145)
WBC # BLD AUTO: 5.8 THOU/UL (ref 4–11)

## 2019-04-25 NOTE — NUR
RECEIED REPORT AND ASSUMED CARE AT 2345. PT TRANSPORTED FROM ED TO ROOM 222.
VSS. CARDIAC MONITORING IN PLACE. PT DENIES COMPLAINTS OF PAIN. ASSESSMENT
COMPLETED AS CHARTED. ADMISSION COMPLETED BY NURSING. PT ORIENTATED TO ROOM,
CALL LIGHT , FALL POLICY. PT UP AD CHRISTIANO IN ROOM. HOURLY ROUNDING COMPLETED AND
ALL NEEDS MET.

## 2019-04-25 NOTE — NUR
Pt is A&O. Resides at home with his son. Independent. No DME. No hx of HH or
SNF. Pt is current at [facility] cardiac rehab and plans to resume
at [facility]. Goal is home. Following.

## 2019-04-25 NOTE — NUR
ASSUMED PT CARE AT 0800 AOX4, SBA, O2 SAT AT 90'S RA. TRACING SR ON CARDIAC
MONITOR. PT LAST BM 4/24/19, ABDOMEN DISTENDED, SOFT ROUND. PT IV ACCESS
INTACT. PT COMPLAINS OF CHEST PAIN. NITRO SUBLINGUAL GIVEN. PT FOR ACCU CHECK.
IV, SKIN INTACT. VSS, ASSESSMENT AS CHARTED. MEDS GIVEN PER MAR. HOURLY
ROUNDING, CALL LIGHT WITHIN REACH. WILL CONTINUE TO MONITOR.

## 2019-04-25 NOTE — EKG
[place] [address]
Phone:  [phone]                     ELECTROCARDIOGRAM REPORT      
_______________________________________________________________________________
 
Name:       RANDALL KIRAN          Room:           [address]    [place]  
M.R.#:  [id number]      Account #:      [id number]  
Admission:  19     Attend Phys:    Ivette Amador MD 
Discharge:               Date of Birth:  [birth year]  
         Report #: [id number]
    [id number]
_______________________________________________________________________________
THIS REPORT FOR:  //name//                      
 
                         [facility] ED
                                       
Test Date:    2019               Test Time:    21:06:27
Pat Name:     RANDALL KIRAN           Department:   
Patient ID:   [id number]            Room:         [facility]
Gender:       M                        Technician:   
:          1950               Requested By: Sherrie Newton
Order Number: [id number] MD:   Randall Barroso
                                 Measurements
Intervals                              Axis          
Rate:         70                       P:            4
OH:           191                      QRS:          -25
QRSD:         100                      T:            20
QT:           374                                    
QTc:          404                                    
                           Interpretive Statements
Sinus rhythm
Abnormal R-wave progression, early transition
LVH 
ST depression consider ischemia versus repolarization.
Baseline wander in lead(s) V5
Compared to ECG 01/15/2019 04:55:23
Early repolarization now present
 
Electronically Signed On 2019 9:45:08 CDT by Randall Barroso
[email or web address]
 
 
 
 
 
 
 
 
 
 
 
 
 
 
 
  <ELECTRONICALLY SIGNED>
                                           By: Randall Barroso MD, FACC   
  19     0945
D: 19   _____________________________________
T: 19   Randall Barroso MD, FAC     /EPI

## 2019-04-25 NOTE — NUR
PT AOX4, UP AD CHRISTIANO, O2 SAT 90'S RA. PT LAST BM 4/24/19.  TRACING SR ON TELE.
IV, SKIN INTACT. FOR ACCU CHECK. PT NPO FOR MIDNIGHT. GIVE REPORT TO
GLORIA LACY. VSS, ASSESSMENT AS CHARTED, MEDS GIVEN PER MAR. FOR CONTINUE OF
CARE.

## 2019-04-26 VITALS — DIASTOLIC BLOOD PRESSURE: 60 MMHG | SYSTOLIC BLOOD PRESSURE: 126 MMHG

## 2019-04-26 VITALS — SYSTOLIC BLOOD PRESSURE: 112 MMHG | DIASTOLIC BLOOD PRESSURE: 62 MMHG

## 2019-04-26 VITALS — SYSTOLIC BLOOD PRESSURE: 109 MMHG | DIASTOLIC BLOOD PRESSURE: 62 MMHG

## 2019-04-26 VITALS — DIASTOLIC BLOOD PRESSURE: 64 MMHG | SYSTOLIC BLOOD PRESSURE: 127 MMHG

## 2019-04-26 VITALS — DIASTOLIC BLOOD PRESSURE: 72 MMHG | SYSTOLIC BLOOD PRESSURE: 139 MMHG

## 2019-04-26 VITALS — DIASTOLIC BLOOD PRESSURE: 64 MMHG | SYSTOLIC BLOOD PRESSURE: 131 MMHG

## 2019-04-26 VITALS — DIASTOLIC BLOOD PRESSURE: 69 MMHG | SYSTOLIC BLOOD PRESSURE: 120 MMHG

## 2019-04-26 VITALS — DIASTOLIC BLOOD PRESSURE: 69 MMHG | SYSTOLIC BLOOD PRESSURE: 115 MMHG

## 2019-04-26 VITALS — DIASTOLIC BLOOD PRESSURE: 77 MMHG | SYSTOLIC BLOOD PRESSURE: 103 MMHG

## 2019-04-26 VITALS — SYSTOLIC BLOOD PRESSURE: 117 MMHG | DIASTOLIC BLOOD PRESSURE: 62 MMHG

## 2019-04-26 VITALS — DIASTOLIC BLOOD PRESSURE: 58 MMHG | SYSTOLIC BLOOD PRESSURE: 124 MMHG

## 2019-04-26 VITALS — SYSTOLIC BLOOD PRESSURE: 131 MMHG | DIASTOLIC BLOOD PRESSURE: 68 MMHG

## 2019-04-26 VITALS — SYSTOLIC BLOOD PRESSURE: 127 MMHG | DIASTOLIC BLOOD PRESSURE: 64 MMHG

## 2019-04-26 VITALS — SYSTOLIC BLOOD PRESSURE: 121 MMHG | DIASTOLIC BLOOD PRESSURE: 57 MMHG

## 2019-04-26 LAB
ANION GAP SERPL CALC-SCNC: 9 MMOL/L (ref 7–16)
APTT BLD: 25.7 SECONDS (ref 25–31.3)
BUN SERPL-MCNC: 33 MG/DL (ref 7–18)
CALCIUM SERPL-MCNC: 8.7 MG/DL (ref 8.5–10.1)
CHLORIDE SERPL-SCNC: 104 MMOL/L (ref 98–107)
CHOLEST SERPL-MCNC: 105 MG/DL (ref ?–200)
CO2 SERPL-SCNC: 26 MMOL/L (ref 21–32)
CREAT SERPL-MCNC: 2.2 MG/DL (ref 0.6–1.3)
EST. AVERAGE GLUCOSE BLD GHB EST-MCNC: 203 MG/DL
GLUCOSE SERPL-MCNC: 104 MG/DL (ref 70–99)
GLYCOHEMOGLOBIN (HGB A1C): 8.7 % (ref 4.8–5.6)
HDLC SERPL-MCNC: 29 MG/DL (ref 40–?)
INR PPP: 1
LDLC SERPL-MCNC: 50 MG/DL (ref ?–100)
POTASSIUM SERPL-SCNC: 4.1 MMOL/L (ref 3.5–5.1)
PROTHROMBIN TIME: 10.6 SECONDS (ref 9.2–11.5)
SERUM ASSESSMENT: CLEAR
SODIUM SERPL-SCNC: 139 MMOL/L (ref 136–145)
TC:HDL: 3.6 RATIO
TRIGL SERPL-MCNC: 132 MG/DL (ref ?–150)
VLDLC SERPL CALC-MCNC: 26 MG/DL (ref ?–40)

## 2019-04-26 PROCEDURE — B41JYZZ FLUOROSCOPY OF OTHER LOWER ARTERIES USING OTHER CONTRAST: ICD-10-PCS | Performed by: INTERNAL MEDICINE

## 2019-04-26 PROCEDURE — 027135Z DILATION OF CORONARY ARTERY, TWO ARTERIES WITH TWO DRUG-ELUTING INTRALUMINAL DEVICES, PERCUTANEOUS APPROACH: ICD-10-PCS | Performed by: INTERNAL MEDICINE

## 2019-04-26 PROCEDURE — 4A023N7 MEASUREMENT OF CARDIAC SAMPLING AND PRESSURE, LEFT HEART, PERCUTANEOUS APPROACH: ICD-10-PCS | Performed by: INTERNAL MEDICINE

## 2019-04-26 PROCEDURE — B211YZZ FLUOROSCOPY OF MULTIPLE CORONARY ARTERIES USING OTHER CONTRAST: ICD-10-PCS | Performed by: INTERNAL MEDICINE

## 2019-04-26 NOTE — CARD
[address]  [address]                    CARDIAC CATH REPORT           
_______________________________________________________________________________
 
Name:       AMBER KIRAN          Room:           [address] [place]  
M.R.#:  [id number]      Account #:      [id number]  
Admission:  04/24/19     Attend Phys:    Ivette Amador MD 
Discharge:               Date of Birth:  [birth year]  
         Report #: [id number]
                                                                     [id number]
_______________________________________________________________________________
THIS REPORT FOR:  //name//                      
 
 
--------------- APPROVED REPORT --------------
 
 
Study performed:  04/26/2019 09:36:35
 
Patient Details
Patient Status: In-Patient                  Room #: 
The patient is a 68 year-old male
 
Event Personnel
Nanci Martinez  CircVlad brady Brad RCIS Monitor, Willie Jo (Slade Jimenes John  Cardiologist
 
Procedures Performed
Left Heart Cath w/or w/o Coronaries [id number] [facility] MARLIN Revasc Chronic 
Ttl Occl Single OM  CTOREVSING Hemostasis w/ Angioseal  MARLIN 
Place w/wo Plasty Addl BR OM 1  DESADDL
 
Indication
Unstable angina 
 
Risk Factors
Hypercholesterolemia, Hypertension
 
Previous Procedures/Diagnoses
Previous PCI, Previous MI
 
Admission/Lab Medications/Medications given during procedure
Platelet Aff. Inhib., Fentanyl IV 25 mcg, Midazolam (Versed) IV 2 
mg
 
Procedure Narrative
The patient was brought electively to the Cardiac Catheterization 
Laboratory and was prepped and draped in a sterile manner. The right 
femoral was infiltrated with 2% Lidocaine subcutaneous anesthesia. A 
[place] 6 FR sheath was inserted into the RFA. Coronary angiography 
was performed using coronary diagnostic catheters. The right coronary 
system was accessed and visualized with a JR 4 6fr catheter. The left 
coronary system was accessed and visualized with a JL 3.5 6fr 
catheter. The left ventricle was accessed and visualized with a 
Straight PIG catheter. Left ventricular/Aortic Valve gradient 
assessed via catheter pullback. Pre-demployment femoral angiogram was 
performed . Closure device was deployed with a Fr Angioseal STS 6Fr. 
 
 
 
[place] [address]                    CARDIAC CATH REPORT           
_______________________________________________________________________________
 
Name:       AMBER KIRAN          Room:           [address] [place]  
..#:  [id number]      Account #:      [id number]  
Admission:  04/24/19     Attend Phys:    Ivette Amador MD 
Discharge:               Date of Birth:  [birth year]  
         Report #: [id number]
                                                                     [id number]
_______________________________________________________________________________
The patient tolerated the procedure well and there were no 
complications associated with the procedure. There was no 
hematoma.
 
Intraoperative Conscious Sedation
Fentanyl  25 mcg   
 
Dose:        1803 mGy  
Contrast Type and Amount:  Visipaque 330 ml    
 
Diagnostic Cath
Left Main 0% narrowing
LAD  40% mid LAD stenosis
Circumflex 100% chronic total occlusion of the prominent first 
marginal branch of the circumflex with 75% proximal circumflex  
narrowing
Right Coronary Dominant vessel widely patent mid right coronary stent 
and 50% distal narrowing
 
Left Ventriculography
Left Ventriculography was not performed.     
 
Hemodynamics
The aortic pressure is 140/61 mmHg with a mean of 76 mmHg. The left 
ventricular pressure is 125/2 mmHg with a mean of mmHg. The left 
ventricular end diastolic pressure is 8 mmHg. There was no gradient 
across the aortic valve upon pullback. 
 
PCI Technique Lesion
Anticoagulation was achieved with Angiomax. Patient was preloaded 
with Angiomax IV 10 ml. Percutaneous coronary intervention was 
performed on the first obtuse marginal branch segment. The lesion 
stenosis prior to intervention was 100% with LINDA 0 flow. A 6FR XB 
LAD 3.0 100CM Guide Catheter was used to engage the ostium. A [employer] Flex 300cm Interventional Guidewire was used to cross the 
lesion.
 
BALLOON DILATION
A Balloon catheter Trek RX 2.5 X 12 was inserted and inflated up to 
16.00atm for 22seconds.
 
STENT DEPLOYMENT
A stent Subiaco RX Stent  2.5X22mm was inserted and inflated up to 
16.00atm for 14seconds.
 
Final angiography reveals 0 % stenosis with LINDA 3 flow.
 
 
 
[place] [address]                    CARDIAC CATH REPORT           
_______________________________________________________________________________
 
Name:       AMBER KIRAN          Room:           [address] [place]  
[facility].#:  [id number]      Account #:      [id number]  
Admission:  04/24/19     Attend Phys:    Ivette Amador MD 
Discharge:               Date of Birth:  [birth year]  
         Report #: [id number]
                                                                     [id number]
_______________________________________________________________________________
 
COMMENTS
I was able to recanalize the chronic total occlusion of the prominent 
first marginal branch of the circumflex utilizing a fine cross 
support catheter and traversing the area chronic total occlusion with 
a soft tip wire and subsequent small balloon, larger balloon, and 
drug-eluting stent
 
PCI Technique Lesion 2
Percutaneous Coronary Intervention was performed on the proximal 
circumflex artery segment. The lesion stenosis prior to intervention 
was 75% with LINDA 3 flow.
 
Stent Deployment
A drug-eluting stent Vincent RX Stent 2.75X8mm was inserted and inflated 
up to 12.00atm for 21seconds.
 
Final angiography reveals 10 % stenosis with LINDA 3 
flow.
 
Conclusion
#1 significant multivessel coronary artery disease characterized by 
the following:
 
A 40% mid LAD narrowing
 
B 75% proximal circumflex stenosis with 100% chronic total occlusion 
of the first marginal branch with faint filling of the distal 
marginal via collaterals
 
C dominant right coronary artery with a widely patent mid right 
coronary stent and 50% distal narrowing
 
#2 normal left-sided hemodynamics study
 
#3 successful recanalization of the chronic total occlusion of the 
first marginal branch with 0% residual narrowing and LINDA-3 flow to  
the distal vessel after deployment of a drug-eluting stent
 
#4 successful percutaneous coronary intervention at the site of 75% 
proximal circumflex stenosis with 10% residual narrowing following 
stent deployment 
 
Recommendations
Cardiac Risk Reduction Program
Aggressive Medical Therapy
 
 
 
[place] [address]                    CARDIAC CATH REPORT           
_______________________________________________________________________________
 
Name:       AMBER KIRAN          Room:           [address] [place]  
..#:  [id number]      Account #:      [id number]  
Admission:  04/24/19     Attend Phys:    Ivette Amador MD 
Discharge:               Date of Birth:  [birth year]  
         Report #: [id number]
                                                                     [id number]
_______________________________________________________________________________
 
Medications Administered
Ticagrelor 
 
Diagnostic Cath Approved by: Jerald June MD        Date/Time: 
04/26/2019 16:54:04
 
 
 
 
 
 
 
 
 
 
 
 
 
 
 
 
 
 
 
 
 
 
 
 
 
 
 
 
 
 
 
 
 
 
 
 
 
 
<ELECTRONICALLY SIGNED>
                                        By:  Jerald June MD, [facility]     
04/26/19     1654
D: 04/26/19 1654_______________________________________
T: 04/26/19 1654Jerald June MD, [facility]        /INF

## 2019-04-26 NOTE — NUR
INITIAL ASSESSMENT COMPLETED AS CHARTED. VSS. PT TRACING SR ON MONITOR. PT
DENIES PAIN, CP, SOA, N/V/D. PT IS CURRENTLY NPO PENDING CARDIAC CATH THIS
A.M. PT DENIES ANY FURTHER NEEDS AT THIS TIME. HOURLY ROUNDING IN PLACE FOR PT
SAFETY. CLWR.

## 2019-04-26 NOTE — NUR
ASSUMED PT CARE REPORT RECEIVED FROM NURSE. PT IS AOX4 SR ON CARDIAC MONITOR
.NPO STATUS FOR CARDIAC CATH TODAY. VSS. PT WENT TO CARDIAC CATH THIS AM. CAME
BACK AT 1130. CONTINUOUSLY MONITORING VITAL SIGNS. ACCUCHECK MEASURED. PT HAS
BEEN NPO NO NEED FOR INSULIN. POST CARDIAC CATH VS LOOK OK. SEE CHART. R BRIT
AREA HAS BEEN OOZING SINCE THEY BROUGHT PT UP FROM CATH LAB. DRESSING WAS
CHANGED AT BEDSIDE BY CARDIAC CATH TECH. NURSE WALKED IN ROOM TO ASSESS SITE
AGAIN. DRESSING WAS SATURATED WITH BLOOD. CARDIAC CATH TECH CAME BACK UP AND
APPLIED NEW DRESSING. DRESSING IN THEN INTACT. WILL CONTINUE TO MONITOR. IVF
INFUSING AT 100PER HOUR. PT HAS URINATED ONCE POST CATH. STILL ON BEDREST
UNTILL 1630. PT ATE LUNCH . WILL CONTINUE TO MONITOR

## 2019-04-27 VITALS — SYSTOLIC BLOOD PRESSURE: 110 MMHG | DIASTOLIC BLOOD PRESSURE: 71 MMHG

## 2019-04-27 VITALS — DIASTOLIC BLOOD PRESSURE: 64 MMHG | SYSTOLIC BLOOD PRESSURE: 127 MMHG

## 2019-04-27 VITALS — SYSTOLIC BLOOD PRESSURE: 127 MMHG | DIASTOLIC BLOOD PRESSURE: 64 MMHG

## 2019-04-27 VITALS — SYSTOLIC BLOOD PRESSURE: 137 MMHG | DIASTOLIC BLOOD PRESSURE: 64 MMHG

## 2019-04-27 VITALS — DIASTOLIC BLOOD PRESSURE: 65 MMHG | SYSTOLIC BLOOD PRESSURE: 137 MMHG

## 2019-04-27 VITALS — SYSTOLIC BLOOD PRESSURE: 160 MMHG | DIASTOLIC BLOOD PRESSURE: 70 MMHG

## 2019-04-27 LAB
ALBUMIN SERPL-MCNC: 3.2 G/DL (ref 3.4–5)
ALP SERPL-CCNC: 67 U/L (ref 46–116)
ALT SERPL-CCNC: 24 U/L (ref 30–65)
ANION GAP SERPL CALC-SCNC: 7 MMOL/L (ref 7–16)
AST SERPL-CCNC: 12 U/L (ref 15–37)
BILIRUB SERPL-MCNC: 0.4 MG/DL
BUN SERPL-MCNC: 30 MG/DL (ref 7–18)
CALCIUM SERPL-MCNC: 8.9 MG/DL (ref 8.5–10.1)
CHLORIDE SERPL-SCNC: 106 MMOL/L (ref 98–107)
CO2 SERPL-SCNC: 26 MMOL/L (ref 21–32)
CREAT SERPL-MCNC: 2.3 MG/DL (ref 0.6–1.3)
GLUCOSE SERPL-MCNC: 98 MG/DL (ref 70–99)
HCT VFR BLD CALC: 25.9 % (ref 42–52)
HGB BLD-MCNC: 9.1 GM/DL (ref 14–18)
MCH RBC QN AUTO: 29.3 PG (ref 26–34)
MCHC RBC AUTO-ENTMCNC: 35.2 G/DL (ref 28–37)
MCV RBC: 83.2 FL (ref 80–100)
MPV: 7.8 FL. (ref 7.2–11.1)
PLATELET COUNT*: 250 THOU/UL (ref 150–400)
POTASSIUM SERPL-SCNC: 4.5 MMOL/L (ref 3.5–5.1)
PROT SERPL-MCNC: 6.6 G/DL (ref 6.4–8.2)
RBC # BLD AUTO: 3.11 MIL/UL (ref 4.5–6)
RDW-CV: 14 % (ref 10.5–14.5)
SODIUM SERPL-SCNC: 139 MMOL/L (ref 136–145)
TROPONIN-I LEVEL: 0.39 NG/ML (ref ?–0.06)
WBC # BLD AUTO: 7.2 THOU/UL (ref 4–11)

## 2019-04-27 NOTE — EKG
[place] [address]
Phone:  [phone]                     ELECTROCARDIOGRAM REPORT      
_______________________________________________________________________________
 
Name:       AMBER KIRAN          Room:           [address]    [place]  
M.R.#:  [id number]      Account #:      [id number]  
Admission:  19     Attend Phys:    Ivette Amador MD 
Discharge:  19     Date of Birth:  [birth year]  
         Report #: [id number]
    [id number]
_______________________________________________________________________________
THIS REPORT FOR:  //name//                      
 
                          [facility]
                                       
Test Date:    2019               Test Time:    11:45:18
Pat Name:     AMBER KIRAN           Department:   
Patient ID:   [id number]            Room:         [address]
Gender:       M                        Technician:   WESTON
:          1950               Requested By: Jerald June
Order Number: [id number]    Reading MD:   Gabino Camilo
                                 Measurements
Intervals                              Axis          
Rate:         59                       P:            19
NJ:           202                      QRS:          -29
QRSD:         99                       T:            269
QT:           411                                    
QTc:          408                                    
                           Interpretive Statements
Sinus rhythm
LVH with secondary repolarization abnormality
Compared to ECG 2019 21:06:27
Early repolarization now present
ST (T wave) deviation no longer present
Possible ischemia no longer present
 
Electronically Signed On 2019 12:29:29 CDT by Gabino Camilo
[email or web address]
 
 
 
 
 
 
 
 
 
 
 
 
 
 
 
 
  <ELECTRONICALLY SIGNED>
                                           By: Gabino Camilo MD, [facility]   
  19     1229
D: 19 1145   _____________________________________
T: 19 1145   Gabino Camilo MD, FAC     /EPI

## 2019-04-27 NOTE — NUR
ASSUMED PT CARE AT 1930. NURSING ASSESSMENT COMPLETED AT START OF SHIFT. PT
DENIES CHEST PAIN THIS SHIFT. RIGHT GROIN DRESSING CLEAN, DRY, INTACT. HOURLY
ROUNDING COMPLETED. SR ON CARDIAC MONITOR. CALL LIGHT WITHIN REACH. PT VOICED
NO CONCERNS THIS SHIFT.

## 2019-04-27 NOTE — EKG
[place] [address]
Phone:  [phone]                     ELECTROCARDIOGRAM REPORT      
_______________________________________________________________________________
 
Name:       AMBER KIRAN          Room:           [address]    [place]  
M.R.#:  [id number]      Account #:      [id number]  
Admission:  19     Attend Phys:    Ivette Amador MD 
Discharge:  19     Date of Birth:  [birth year]  
         Report #: [id number]
    [id number]
_______________________________________________________________________________
THIS REPORT FOR:  //name//                      
 
                          [facility]
                                       
Test Date:    2019               Test Time:    06:04:43
Pat Name:     AMBER KIRAN           Department:   
Patient ID:   [id number]            Room:         [address]
Gender:       M                        Technician:   VAUGHN
:          1950               Requested By: Jerald June
Order Number: [id number]    Reading MD:   Gabino Camilo
                                 Measurements
Intervals                              Axis          
Rate:         72                       P:            -17
PA:           193                      QRS:          -30
QRSD:         98                       T:            241
QT:           376                                    
QTc:          412                                    
                           Interpretive Statements
Sinus rhythm
LVH with secondary repolarization abnormality
Baseline wander in lead(s) V5
Compared to ECG 2019 21:06:27
Early repolarization now present
ST (T wave) deviation no longer present
Possible ischemia no longer present
 
Electronically Signed On 2019 12:32:23 CDT by Gabino Camilo
[email or web address]
 
 
 
 
 
 
 
 
 
 
 
 
 
 
 
  <ELECTRONICALLY SIGNED>
                                           By: Gabino Camilo MD, FACC   
  19     1232
D: 19 0604   _____________________________________
T: 19 0604   Gabino Camilo MD, FACC     /EPI

## 2019-08-07 ENCOUNTER — HOSPITAL ENCOUNTER (OUTPATIENT)
Dept: HOSPITAL 35 - NUC | Age: 69
End: 2019-08-07
Payer: COMMERCIAL

## 2019-08-07 DIAGNOSIS — E78.5: ICD-10-CM

## 2019-08-07 DIAGNOSIS — Z82.49: ICD-10-CM

## 2019-08-07 DIAGNOSIS — Z79.899: ICD-10-CM

## 2019-08-07 DIAGNOSIS — E11.9: ICD-10-CM

## 2019-08-07 DIAGNOSIS — I10: ICD-10-CM

## 2019-08-07 DIAGNOSIS — Z88.8: ICD-10-CM

## 2019-08-07 DIAGNOSIS — I25.10: Primary | ICD-10-CM

## 2019-08-07 DIAGNOSIS — Z95.5: ICD-10-CM

## 2020-05-20 ENCOUNTER — HOSPITAL ENCOUNTER (INPATIENT)
Dept: HOSPITAL 35 - ER | Age: 70
LOS: 2 days | Discharge: HOME | DRG: 303 | End: 2020-05-22
Attending: INTERNAL MEDICINE | Admitting: INTERNAL MEDICINE
Payer: COMMERCIAL

## 2020-05-20 VITALS — DIASTOLIC BLOOD PRESSURE: 72 MMHG | SYSTOLIC BLOOD PRESSURE: 142 MMHG

## 2020-05-20 VITALS — DIASTOLIC BLOOD PRESSURE: 86 MMHG | SYSTOLIC BLOOD PRESSURE: 158 MMHG

## 2020-05-20 VITALS — HEIGHT: 62.99 IN | WEIGHT: 145 LBS | BODY MASS INDEX: 25.69 KG/M2

## 2020-05-20 VITALS — SYSTOLIC BLOOD PRESSURE: 145 MMHG | DIASTOLIC BLOOD PRESSURE: 78 MMHG

## 2020-05-20 DIAGNOSIS — E11.22: ICD-10-CM

## 2020-05-20 DIAGNOSIS — I25.10: Primary | ICD-10-CM

## 2020-05-20 DIAGNOSIS — K21.9: ICD-10-CM

## 2020-05-20 DIAGNOSIS — Z90.49: ICD-10-CM

## 2020-05-20 DIAGNOSIS — M54.9: ICD-10-CM

## 2020-05-20 DIAGNOSIS — Z79.899: ICD-10-CM

## 2020-05-20 DIAGNOSIS — I12.9: ICD-10-CM

## 2020-05-20 DIAGNOSIS — Z79.82: ICD-10-CM

## 2020-05-20 DIAGNOSIS — Z88.1: ICD-10-CM

## 2020-05-20 DIAGNOSIS — N18.9: ICD-10-CM

## 2020-05-20 DIAGNOSIS — G89.29: ICD-10-CM

## 2020-05-20 DIAGNOSIS — Z95.5: ICD-10-CM

## 2020-05-20 DIAGNOSIS — E78.5: ICD-10-CM

## 2020-05-20 LAB
ALBUMIN SERPL-MCNC: 4 G/DL (ref 3.4–5)
ALT SERPL-CCNC: 17 U/L (ref 30–65)
ANION GAP SERPL CALC-SCNC: 11 MMOL/L (ref 7–16)
AST SERPL-CCNC: 24 U/L (ref 15–37)
BASOPHILS NFR BLD AUTO: 1.1 % (ref 0–2)
BILIRUB DIRECT SERPL-MCNC: < 0.1 MG/DL
BILIRUB SERPL-MCNC: 0.7 MG/DL
BUN SERPL-MCNC: 25 MG/DL (ref 7–18)
CALCIUM SERPL-MCNC: 9 MG/DL (ref 8.5–10.1)
CHLORIDE SERPL-SCNC: 104 MMOL/L (ref 98–107)
CO2 SERPL-SCNC: 23 MMOL/L (ref 21–32)
CREAT SERPL-MCNC: 1.9 MG/DL (ref 0.7–1.3)
EOSINOPHIL NFR BLD: 7.6 % (ref 0–3)
ERYTHROCYTE [DISTWIDTH] IN BLOOD BY AUTOMATED COUNT: 14 % (ref 10.5–14.5)
GLUCOSE SERPL-MCNC: 176 MG/DL (ref 74–106)
GRANULOCYTES NFR BLD MANUAL: 51.7 % (ref 36–66)
HCT VFR BLD CALC: 33.6 % (ref 42–52)
HGB BLD-MCNC: 11.5 GM/DL (ref 14–18)
LYMPHOCYTES NFR BLD AUTO: 31.7 % (ref 24–44)
MCH RBC QN AUTO: 29.5 PG (ref 26–34)
MCHC RBC AUTO-ENTMCNC: 34.3 G/DL (ref 28–37)
MCV RBC: 86.2 FL (ref 80–100)
MONOCYTES NFR BLD: 7.9 % (ref 1–8)
NEUTROPHILS # BLD: 3.1 THOU/UL (ref 1.4–8.2)
PLATELET # BLD: 240 THOU/UL (ref 150–400)
POTASSIUM SERPL-SCNC: 4.5 MMOL/L (ref 3.5–5.1)
PROT SERPL-MCNC: 7.4 G/DL (ref 6.4–8.2)
RBC # BLD AUTO: 3.9 MIL/UL (ref 4.5–6)
SODIUM SERPL-SCNC: 138 MMOL/L (ref 136–145)
TROPONIN I SERPL-MCNC: <0.06 NG/ML (ref ?–0.06)
WBC # BLD AUTO: 6 THOU/UL (ref 4–11)

## 2020-05-20 PROCEDURE — 10081 I&D PILONIDAL CYST COMP: CPT

## 2020-05-20 NOTE — NUR
REC PT FROM ED APPROX 1820, NO DIET ORDER DIETARY SAID, NO SANDWICH IN UNIT,
CALLED ORDER IN; WILL CHECK AGAIN AND REMEDY IF POSSIBLE. PT IS A&0X4, AMB
STEADY, HAD MI LATE 2018-EARLY 2019. NO CP CURRENTLY. CALL LIGHT DEMO DONE,
WILL FIND IV POLE AND START IVF AND ENSURE HE GETS FOOD. WILL START ON
ADMISSION PROCESS AND ALLOW ONCOMING SHIFT TO FINISH IN BETWEEN CARES OF OTHER
PATIENTS. ENCOURAGED PT TO USE CALL LIGHT FOR ANY NEEDS

## 2020-05-20 NOTE — NUR
PT ADMITTED FROM ED WITH CHEST PAIN,PRIOR H/O STENT PLACEMENTX4.PT IS
A/OX4.VSS.IVF AS ORDERED.ADMISSION ASSESSMENT COMPLETED AS DOCUMENTED.PT
DENIES CHEST PAIN OR ANY DISTRESS AT THIS TIME.AMBULATED WITH STEADY GAIT TO
BR.PT DENIES NEEDS AT THIS TIME.WILL CONT TO MONITOR PER POC.

## 2020-05-21 VITALS — DIASTOLIC BLOOD PRESSURE: 71 MMHG | SYSTOLIC BLOOD PRESSURE: 150 MMHG

## 2020-05-21 VITALS — DIASTOLIC BLOOD PRESSURE: 72 MMHG | SYSTOLIC BLOOD PRESSURE: 142 MMHG

## 2020-05-21 VITALS — DIASTOLIC BLOOD PRESSURE: 74 MMHG | SYSTOLIC BLOOD PRESSURE: 153 MMHG

## 2020-05-21 VITALS — DIASTOLIC BLOOD PRESSURE: 73 MMHG | SYSTOLIC BLOOD PRESSURE: 149 MMHG

## 2020-05-21 VITALS — SYSTOLIC BLOOD PRESSURE: 166 MMHG | DIASTOLIC BLOOD PRESSURE: 76 MMHG

## 2020-05-21 LAB
ANION GAP SERPL CALC-SCNC: 9 MMOL/L (ref 7–16)
BASOPHILS NFR BLD AUTO: 1.2 % (ref 0–2)
BUN SERPL-MCNC: 24 MG/DL (ref 7–18)
CALCIUM SERPL-MCNC: 8.9 MG/DL (ref 8.5–10.1)
CHLORIDE SERPL-SCNC: 105 MMOL/L (ref 98–107)
CHOLEST SERPL-MCNC: 92 MG/DL (ref ?–200)
CO2 SERPL-SCNC: 24 MMOL/L (ref 21–32)
CREAT SERPL-MCNC: 1.9 MG/DL (ref 0.7–1.3)
EOSINOPHIL NFR BLD: 7.1 % (ref 0–3)
ERYTHROCYTE [DISTWIDTH] IN BLOOD BY AUTOMATED COUNT: 14 % (ref 10.5–14.5)
GLUCOSE SERPL-MCNC: 111 MG/DL (ref 74–106)
GRANULOCYTES NFR BLD MANUAL: 42.4 % (ref 36–66)
HCT VFR BLD CALC: 33.9 % (ref 42–52)
HDLC SERPL-MCNC: 23 MG/DL (ref 40–?)
HGB BLD-MCNC: 11.4 GM/DL (ref 14–18)
LDLC SERPL-MCNC: 24 MG/DL (ref ?–100)
LYMPHOCYTES NFR BLD AUTO: 41.9 % (ref 24–44)
MAGNESIUM SERPL-MCNC: 1.7 MG/DL (ref 1.8–2.4)
MCH RBC QN AUTO: 29.7 PG (ref 26–34)
MCHC RBC AUTO-ENTMCNC: 33.6 G/DL (ref 28–37)
MCV RBC: 88.3 FL (ref 80–100)
MONOCYTES NFR BLD: 7.4 % (ref 1–8)
NEUTROPHILS # BLD: 2.5 THOU/UL (ref 1.4–8.2)
PLATELET # BLD: 220 THOU/UL (ref 150–400)
POTASSIUM SERPL-SCNC: 4.1 MMOL/L (ref 3.5–5.1)
RBC # BLD AUTO: 3.83 MIL/UL (ref 4.5–6)
SODIUM SERPL-SCNC: 138 MMOL/L (ref 136–145)
TC:HDL: 4 RATIO
TRIGL SERPL-MCNC: 229 MG/DL (ref ?–150)
TROPONIN I SERPL-MCNC: <0.06 NG/ML (ref ?–0.06)
VLDLC SERPL CALC-MCNC: 46 MG/DL (ref ?–40)
WBC # BLD AUTO: 6 THOU/UL (ref 4–11)

## 2020-05-21 NOTE — NUR
ASSESSMENT AS DOCUMENTED, AND VSS. PATIENT WAS AT NUC STRESS TEST MOST OF THE
AFTER NOON.
STAEDY AND AMBULATING, DENEIS CP OR DISCOMFORT AND WILL CONTINUE WITH POC.

## 2020-05-21 NOTE — EKG
[facility]
[place]   [address]                     ELECTROCARDIOGRAM REPORT      
_______________________________________________________________________________
 
Name:       RAMON GOODMAN       Room #:         219-P       ADM [place]  
M.R.#:      [id number]                       Account #:      [id number]  
Admission:  20    Attend Phys:    Hamlet Tran MD      
Discharge:              Date of Birth:  [birth year]  
                                                          Report #: [id number]
                                                                    [id number]
_______________________________________________________________________________
THIS REPORT FOR:  
 
cc:  Ramon Carson MD, Michael D. MD Lundgren,Chuy CONNOR MD [facility]                                        ~
THIS REPORT FOR:   //name//                          
 
                         [facility] ED
                                       
Test Date:    2020               Test Time:    14:44:53
Pat Name:     RAMON GOODMAN           Department:   
Patient ID:   [id number]            Room:         219
Gender:       M                        Technician:   DORITA
:          1950               Requested By: Radha Mariano
Order Number: [id number] MD:   Chuy Rico
                                 Measurements
Intervals                              Axis          
Rate:         61                       P:            18
NV:           183                      QRS:          -21
QRSD:         96                       T:            -30
QT:           393                                    
QTc:          396                                    
                           Interpretive Statements
Sinus rhythm
Left ventricular hypertrophy
Anterior Q waves, possibly due to LVH
Nonspecific T wave abnormality
Compared to ECG 2019 05:51:08
ST and T wave abnormality is less pronounced
Electronically Signed On 2020 8:03:12 CDT by Chuy Rico
[email or web address]
 
 
 
 
 
 
 
 
 
 
 
 
 
  <ELECTRONICALLY SIGNED>
   By: Chuy Rico MD, FAC   
  20     0803
D: 20 1444                           _____________________________________
T: 20 1444                           Chuy Rico MD, [facility]     /EPI

## 2020-05-22 VITALS — SYSTOLIC BLOOD PRESSURE: 139 MMHG | DIASTOLIC BLOOD PRESSURE: 66 MMHG

## 2020-05-22 VITALS — DIASTOLIC BLOOD PRESSURE: 77 MMHG | SYSTOLIC BLOOD PRESSURE: 156 MMHG

## 2020-05-22 VITALS — DIASTOLIC BLOOD PRESSURE: 71 MMHG | SYSTOLIC BLOOD PRESSURE: 162 MMHG

## 2020-05-22 NOTE — NUR
ASSESSMENTS AS CHARTED, MEDS GIVEN AS CHARTED. PATIENT RESTING IN ROOM DURING
SHIFT UP AT BHAVESH. DENIES PAIN. RECEIVING IV FLUIDS. ON ROOM AIR. DID NOT NEED
COVERAGE FOR BLOOD GLUCOSE DURING SHIFT. PATIENT IS AWAITING RESULTS OF STRESS
TEST. MAY GO HOME TODAY DEPENDING ON RESULTS. FALL PRECAUTIONS IN PLACE DURING
SHIFT.

## 2020-05-22 NOTE — NUR
ASSESSMENT AS CHARTED. PT ALERT AND ORIENTED. VSS. DENIED HAVING CHEST PAIN.
SEEN BY DR. ALEGRE. ORDERS GIVEN TO DISCHARGE PT TO HOME. DISCHARGE INSTRUCTIONS
GIVEN TO PT. PT VERBERLISED UNDERSTANDING.

## 2021-10-23 ENCOUNTER — HOSPITAL ENCOUNTER (INPATIENT)
Dept: HOSPITAL 35 - ER | Age: 71
LOS: 4 days | Discharge: HOME | DRG: 637 | End: 2021-10-27
Attending: INTERNAL MEDICINE | Admitting: INTERNAL MEDICINE
Payer: COMMERCIAL

## 2021-10-23 VITALS — BODY MASS INDEX: 26.4 KG/M2 | WEIGHT: 149 LBS | HEIGHT: 62.99 IN

## 2021-10-23 DIAGNOSIS — Z88.1: ICD-10-CM

## 2021-10-23 DIAGNOSIS — G93.41: ICD-10-CM

## 2021-10-23 DIAGNOSIS — E11.22: ICD-10-CM

## 2021-10-23 DIAGNOSIS — I12.9: ICD-10-CM

## 2021-10-23 DIAGNOSIS — N17.0: ICD-10-CM

## 2021-10-23 DIAGNOSIS — Z79.899: ICD-10-CM

## 2021-10-23 DIAGNOSIS — Z91.14: ICD-10-CM

## 2021-10-23 DIAGNOSIS — Z90.49: ICD-10-CM

## 2021-10-23 DIAGNOSIS — Z79.82: ICD-10-CM

## 2021-10-23 DIAGNOSIS — I25.2: ICD-10-CM

## 2021-10-23 DIAGNOSIS — Z95.5: ICD-10-CM

## 2021-10-23 DIAGNOSIS — E78.5: ICD-10-CM

## 2021-10-23 DIAGNOSIS — N18.30: ICD-10-CM

## 2021-10-23 DIAGNOSIS — Z20.822: ICD-10-CM

## 2021-10-23 DIAGNOSIS — E11.65: Primary | ICD-10-CM

## 2021-10-23 DIAGNOSIS — I25.10: ICD-10-CM

## 2021-10-23 PROCEDURE — 10081 I&D PILONIDAL CYST COMP: CPT

## 2021-10-23 NOTE — HC
[facility]
[address]
[place]   [address]                     CONSULTATION                  
_______________________________________________________________________________
 
Name:       QIANARANDALL FELDER         Room #:         213-P       ADM [place]  
M.R.#:      [id number]                       Account #:      [id number]  
Admission:  10/24/21    Attend Phys:    Vito Domingo MD      
Discharge:              Date of Birth:  [birth year]  
                                                          Report #: [id number]
                                                                    [id number] 
_______________________________________________________________________________
THIS REPORT FOR:  
 
cc:  Randall Peace MD, Michael D. MD Khosla, Parveen K. MD                                         ~
 
DATE OF SERVICE: 10/24/2021
 
HISTORY OF PRESENT ILLNESS:  A 70-year-old male patient who had some speech 
difficulty as well as headache for about 3 days.  When he came in, his blood 
sugar was extremely high and he did have a slight temperature.  His headache has
resolved and he thinks his speech difficulty is also better.  When I asked him 
how often he checked his blood sugar, he indicated that he does not check it as 
often as he should.
 
REVIEW OF SYSTEMS:  Indicates he had stents in his heart.  He has renal disease.
 He was not feeling well when he came in with a very high blood sugar.  He has 
some drainage in the back of his throat.  Presently, he indicates all his 
neurological symptoms have resolved.  He does take medicine for diabetes, but as
I mentioned, he does not check his blood sugar as required.  He does have a 
history of chest pain and unstable angina.  He had a back surgery in the past 
and he has a history of hypertension.  That was his relevant 14-point review of 
system.
 
PAST MEDICAL HISTORY:  Positive for cardiac problems.
 
SOCIAL HISTORY:  He says he does not drink any alcohol.
 
PHYSICAL EXAMINATION:  The patient is alert and responsive.  His memory is 
reasonable.  He is oriented.  His speech looks intact.  Cranial nerve 
examination 2-12 looks unremarkable and his position sense is intact on both 
sides.  He moves all 4 extremities reasonably well and he has normal strength in
all 4 extremities.  His position sense is intact in both lower extremities.  His
plantars are mute and his reflexes are diminished as expected with his diabetes.
 His pulses are difficult to feel, but I believe are palpable.  No meningeal 
sign.  I could not look at the patient's fundus.  There is no meningeal sign.  
There is no carotid bruit.  No thyroid masses present.  Cardiac and respiratory 
examinations appear unremarkable.  No respiratory difficulty was noticed.  Blood
pressure is 133/64, pulse is 65, respirations 18, and temperature is 97.3.
 
LABORATORY DATA:  White count is 7.0.  Blood sugar on admission was 783.  His 
creatinine was 3.1, sodium was ____, but that needs to be corrected because of 
his hyperglycemia.
 
IMPRESSION:  Episodes of speech difficulty and headache.  It may all be 
secondary to hyperglycemia, but he has severe dyslipidemia, uncontrolled 
 
 
 
[address]   [address]                     CONSULTATION                  
_______________________________________________________________________________
 
Name:       RANDALL KIRAN         Room #:         213-P       ADM [place]  
M.R.#:      [id number]                       Account #:      [id number]  
Admission:  10/24/21    Attend Phys:    Vito Domingo MD      
Discharge:              Date of Birth:  [birth year]  
                                                          Report #: [id number]
                                                                    [id number] 
_______________________________________________________________________________
 
diabetes and he had a prior stent.  A CT scan does appear to be showing disease 
and because of that, I will get an MRI done and also go ahead and do a carotid 
Doppler on him.  Because of the kidney functions, CT angio is not possible in 
this patient.  He appeared to be stable at the moment and he appeared to be on a
combination of aspirin and Plavix.  I believe this workup is indicated because 
of multiple vascular risk factor and fluctuating symptoms in this patient.
 
Thank you very much for this referral and I will follow up after these tests are
available.
 
 
 
 
 
 
 
 
 
 
 
 
 
 
 
 
 
 
 
 
 
 
 
 
 
 
 
 
 
 
 
 
 
 
                         
   By:                               
                   
D: 10/24/21 1235                           _____________________________________
T: 10/24/21 2051                           Natanael Whelan MD           /nt

## 2021-10-23 NOTE — EEG
[facility]
[address]
[place]  [address]                      ELECTROENCEPHALOGRAM          
_______________________________________________________________________________
 
Name:       AMBER KIRAN          Room #:         213-P       [facility] [place]  
M.R.#:      [id number]      Account #:      [id number]  
Admission:  10/24/21     Attend Phys:    Agnes Faith MD       
Discharge:               Date of Birth:  [birth year]  
                                                           Report #: [id number]
    [id number]
_______________________________________________________________________________
THIS REPORT FOR:   //name//                          
 
DATE OF SERVICE: 10/26/2021
 
This patient is being evaluated for seizures.  EEG was done by placing the 
electrode by standard 10-20 system of electrode placement.  Both referential and
sequential montages were used for recording.  Background activity in this 
patient's EEG is about 9 Hz and 30 microvolt.  There is a symmetrical activity. 
Photic stimulation is unremarkable.  The patient went to sleep and that is 
associated with bilateral slowing and vertex sharp waves.  Throughout the 
record, no active epileptiform activity was noticed.
 
IMPRESSION:  This patient's EEG is unremarkable.
 
Thank you very much for this referral.
 
 
 
 
 
 
 
 
 
 
 
 
 
 
 
 
 
 
 
 
 
 
 
 
 
 
 
 
 
                              
   By:                               
                   
D: 10/27/21 0817                           _____________________________________
T: 10/27/21 0821                           Natanael Whelan MD           /nt

## 2021-10-23 NOTE — EMS
[facility]
[address]
[place]   [address]                     EMS Patient Care Report       
_______________________________________________________________________________
 
Name:       AMBER KIRAN         Room #:                     REG   
MEHDI#:      [id number]                       Account #:      [id number]  
Admission:  10/23/21    Attend Phys:                          
Discharge:              Date of Birth:  [birth year]  
                                                          Report #: [id number]
                                                                    [id number]
_______________________________________________________________________________
THIS REPORT FOR:   //name//                          
 
Report Transmitted: 10/23/2021 23:36
EMS Care Summary
[place], [place]/[facility]
Incident [id number] @ 10/23/2021 23:20
 
Incident Location
[address] [place], [place] [id number]
 
Patient
AMBER KIRAN
Male, 70 Years
 1950
 
Patient Address
[address] [place] [address]
 
Patient History
Cardiac - Stent,Type 2 Diabetes,
 
Patient Allergies
No known allergies,
 
 
Chief Complaint
I'm having trouble getting my words out
 
Disposition
Transported No Lights/[facility]
 
Dispatch Reason
Stroke/CVA
 
Transported To
[facility]
 
Narrative
Called for a stroke.   Upon arrival, pt met us at the door, he was ROLDAN x 3 c/o 
difficulty getting his words out.  He said this has been going on for several 
days, in addition, he had a headache earlier in the week.  He denied any type 
of falls or head trauma.  He agreed to go to the ER to get checked out.  He 
went back into the house, grabbed his stuff and met us outside by the garage.  
He then walked down to the ambulance, sat down on the EMS cot and was loaded 
 
 
 
[facility]
[address]
[place]   [address]                     EMS Patient Care Report       
_______________________________________________________________________________
 
Name:       AMBER KIRAN         Room #:                     REG   
M.R.#:      [id number]                       Account #:      [id number]  
Admission:  10/23/21    Attend Phys:                          
Discharge:              Date of Birth:  [birth year]  
                                                          Report #: [id number]
                                                                    [id number]
_______________________________________________________________________________
all w/o incident.  Vitals obtained.  18g SL.  D-stick read "HI".  Vitals 
repeated.  En route: no changes.  RR to the ER.  Arrived: pt taken to ER #8 and 
moved to their bed w/o incident.  Pt care & report to ER staff. 
 
Initial Vitals
@23:43P: 78,R: 16,BP: 205/89,Pain: 0/10,GCS: 15,Glucose: -2,CO: 0,SpO2: 
99,Revised Trauma: 12, 
@23:41P: 78,R: 16,BP: 188/92,Pain: 0/10,GCS: 15,SpO2: 97,Revised Trauma: 12,
 
Assessments
@23:49MENTAL:Person Oriented,Event Oriented,Time Oriented,Place 
Oriented,SKIN:HEENT:Eyes: Left Pupil: 3-mm,Eyes: Right Pupil: 3-mm,Head/Face: 
No Abnormalities,LUNG SOUNDS:ABDOMEN:PELVIS//GI:EXTREMITIES:Left Arm: No 
Abnormalities,Right Arm: No Abnormalities,Left Leg: No Abnormalities,Right Leg: 
No Abnormalities,PULSE:Radial: 2+ Normal,NEURO:No Abnormalities, 
 
Impression
Diabetic Hyperglycemia
 
Procedures
@23:43 IV Therapy - Saline Lock 8cc (18 ga) Site: Forearm-Left Response: 
UnchangedSucceeded 
@23:32 ALS Assessment Response: UnchangedSucceeded
@23:40 Stretcher Response: Unchanged
 
 
Timeline
23:17,Call Received
23:17,Dispatch Notified
23:20,Dispatched
23:22,En Route
23:29,On Scene
23:32,At Patient
23:32,ALS Assessment,Response: UnchangedSucceeded,
23:40,Stretcher,Response: Unchanged
23:41,BP: 188/92 M,PULSE: 78,RR: 16 R,SPO2: 97 Ox,ETCO2:  ,BG: ,PAIN: 0,GCS: 15,
23:43,BP: 205/89 M,PULSE: 78,RR: 16 R,SPO2: 99 Ox,ETCO2:  ,BG: -2,PAIN: 0,GCS: 
15, 
23:43,IV Therapy - Saline Lock 8cc 18 ga Site: Forearm-Left,Response: 
UnchangedSucceeded, 
23:45,Depart Scene
23:53,At Destination
00:08,Call Closed
 
Disclaimer
v1.1     Copyright  [employer]
 
 
 
[place] [address]                     EMS Patient Care Report       
_______________________________________________________________________________
 
Name:       AMBER KIRAN         Room #:                     DANIELLE HARDING#:      [id number]                       Account #:      [id number]  
Admission:  10/23/21    Attend Phys:                          
Discharge:              Date of Birth:  [birth year]  
                                                          Report #: [id number]
                                                                    [id number]
_______________________________________________________________________________
This EMS Care Summary contains data elements from the applicable legal record 
(which may be displayed differently). It is designed to provide pertinent 
information for the following purposes: continuity of care, clinical quality, 
and state data reporting. The complete legal record is available to ED staff 
and administrators of the receiving hospital in [facility]'s Patient Tracker. All data 
is provided "as is."

## 2021-10-24 VITALS — DIASTOLIC BLOOD PRESSURE: 64 MMHG | SYSTOLIC BLOOD PRESSURE: 133 MMHG

## 2021-10-24 VITALS — DIASTOLIC BLOOD PRESSURE: 66 MMHG | SYSTOLIC BLOOD PRESSURE: 134 MMHG

## 2021-10-24 VITALS — SYSTOLIC BLOOD PRESSURE: 186 MMHG | DIASTOLIC BLOOD PRESSURE: 79 MMHG

## 2021-10-24 VITALS — DIASTOLIC BLOOD PRESSURE: 52 MMHG | SYSTOLIC BLOOD PRESSURE: 133 MMHG

## 2021-10-24 VITALS — SYSTOLIC BLOOD PRESSURE: 128 MMHG | DIASTOLIC BLOOD PRESSURE: 66 MMHG

## 2021-10-24 VITALS — DIASTOLIC BLOOD PRESSURE: 70 MMHG | SYSTOLIC BLOOD PRESSURE: 186 MMHG

## 2021-10-24 VITALS — DIASTOLIC BLOOD PRESSURE: 93 MMHG | SYSTOLIC BLOOD PRESSURE: 159 MMHG

## 2021-10-24 LAB
ALBUMIN SERPL-MCNC: 3.8 G/DL (ref 3.4–5)
ALT SERPL-CCNC: 17 U/L (ref 30–65)
ANION GAP SERPL CALC-SCNC: 12 MMOL/L (ref 7–16)
AST SERPL-CCNC: 10 U/L (ref 15–37)
BASOPHILS NFR BLD AUTO: 1.2 % (ref 0–2)
BILIRUB SERPL-MCNC: 0.5 MG/DL (ref 0.2–1)
BILIRUB UR-MCNC: NEGATIVE MG/DL
BUN SERPL-MCNC: 39 MG/DL (ref 7–18)
CALCIUM SERPL-MCNC: 8.9 MG/DL (ref 8.5–10.1)
CHLORIDE SERPL-SCNC: 91 MMOL/L (ref 98–107)
CHOLEST SERPL-MCNC: 164 MG/DL (ref ?–200)
CO2 SERPL-SCNC: 23 MMOL/L (ref 21–32)
COLOR UR: YELLOW
CREAT SERPL-MCNC: 3.1 MG/DL (ref 0.7–1.3)
EOSINOPHIL NFR BLD: 7 % (ref 0–3)
ERYTHROCYTE [DISTWIDTH] IN BLOOD BY AUTOMATED COUNT: 13.6 % (ref 10.5–14.5)
GLUCOSE SERPL-MCNC: 783 MG/DL (ref 74–106)
GRANULOCYTES NFR BLD MANUAL: 47.3 % (ref 36–66)
HCT VFR BLD CALC: 33.6 % (ref 42–52)
HDLC SERPL-MCNC: 27 MG/DL (ref 40–?)
HGB BLD-MCNC: 11.6 GM/DL (ref 14–18)
KETONES UR STRIP-MCNC: NEGATIVE MG/DL
LYMPHOCYTES NFR BLD AUTO: 34.6 % (ref 24–44)
MCH RBC QN AUTO: 29.5 PG (ref 26–34)
MCHC RBC AUTO-ENTMCNC: 34.6 G/DL (ref 28–37)
MCV RBC: 85.2 FL (ref 80–100)
MONOCYTES NFR BLD: 9.9 % (ref 1–8)
NEUTROPHILS # BLD: 3.3 THOU/UL (ref 1.4–8.2)
PLATELET # BLD: 228 THOU/UL (ref 150–400)
POTASSIUM SERPL-SCNC: 5 MMOL/L (ref 3.5–5.1)
PROT SERPL-MCNC: 7.3 G/DL (ref 6.4–8.2)
RBC # BLD AUTO: 3.95 MIL/UL (ref 4.5–6)
RBC # UR STRIP: (no result) /UL
SERUM ASSESSMENT: (no result)
SODIUM SERPL-SCNC: 126 MMOL/L (ref 136–145)
SP GR UR STRIP: <= 1.005 (ref 1–1.03)
TC:HDL: 6.1 RATIO
TRIGL SERPL-MCNC: 1276 MG/DL (ref ?–150)
URINE CLARITY: CLEAR
URINE GLUCOSE-RANDOM*: (no result)
URINE LEUKOCYTES-REFLEX: NEGATIVE
URINE NITRITE-REFLEX: NEGATIVE
URINE PROTEIN (DIPSTICK): NEGATIVE
UROBILINOGEN UR STRIP-ACNC: 0.2 E.U./DL (ref 0.2–1)
VLDLC SERPL CALC-MCNC: 255 MG/DL (ref ?–40)
WBC # BLD AUTO: 7 THOU/UL (ref 4–11)

## 2021-10-24 NOTE — NUR
pt admitted to room 213 at around 0320. Alert and orientedx4, sr on tele,
denies pain or sob, admission hx, assessments and education completed, bs
406 upon arrival, np notified, orders received, 12 units of insulin lispro
given, bs recheck 287, np notified, no new orders, vss, no distress noted,
will pass on report

## 2021-10-24 NOTE — EKG
[place] [address] [facility]
[place]  [address]
Phone:  [phone]                    ELECTROCARDIOGRAM REPORT      
_______________________________________________________________________________
 
Name:       AMBER KIRAN         Room #:         213-P       ADM IN  
M.R.#:      [id number]     Account #:      [id number]  
Admission:  10/24/21    Attend Phys:    Vito Domingo MD      
Discharge:              Date of Birth:  [birth year]  
                                                          Report #: [id number]
   [id number]
_______________________________________________________________________________
                          [facility]
                                       
Test Date:    2021-10-24               Test Time:    08:45:45
Pat Name:     AMBER KIRAN           Department:   
Patient ID:   [id number]            Room:         213
Gender:       M                        Technician:   
:          1950               Requested By: Titus Gonzalez
Order Number: [id number] MD:   Jayson Johnson
                                 Measurements
Intervals                              Axis          
Rate:         58                       P:            59
WV:           192                      QRS:          -27
QRSD:         105                      T:            -37
QT:           424                                    
QTc:          417                                    
                           Interpretive Statements
Sinus rhythm
Left ventricular hypertrophy
Nonspecific T abnormalities, inferior leads
No previous ECG available for comparison
Electronically Signed On 10- 13:33:27 CDT by Jayson Johnson
[email or web address]
 
 
 
 
 
 
 
 
 
 
 
 
 
 
 
 
 
 
 
 
 
  <ELECTRONICALLY SIGNED>
   By: Jayson Johnson MD, [facility]   
  10/24/21     1333
D: 10/24/21 0845                           _____________________________________
T: 10/24/21 0845                           Jayson Johnson MD, FACC     /EPI

## 2021-10-24 NOTE — NUR
PT ALERT AND ORIENTED. VSS. DENIED HAVING PAIN OR DISCOMFORT. SEEN BY PHOENIX ALVAREZ. NO NEW ORDERS. PT PROGRESSING WELL TOWARDS DISCHARGE GOAL.

## 2021-10-25 VITALS — SYSTOLIC BLOOD PRESSURE: 149 MMHG | DIASTOLIC BLOOD PRESSURE: 73 MMHG

## 2021-10-25 VITALS — DIASTOLIC BLOOD PRESSURE: 77 MMHG | SYSTOLIC BLOOD PRESSURE: 137 MMHG

## 2021-10-25 VITALS — SYSTOLIC BLOOD PRESSURE: 136 MMHG | DIASTOLIC BLOOD PRESSURE: 69 MMHG

## 2021-10-25 VITALS — DIASTOLIC BLOOD PRESSURE: 61 MMHG | SYSTOLIC BLOOD PRESSURE: 142 MMHG

## 2021-10-25 VITALS — SYSTOLIC BLOOD PRESSURE: 172 MMHG | DIASTOLIC BLOOD PRESSURE: 83 MMHG

## 2021-10-25 LAB
ANION GAP SERPL CALC-SCNC: 11 MMOL/L (ref 7–16)
BUN SERPL-MCNC: 30 MG/DL (ref 7–18)
CALCIUM SERPL-MCNC: 8.5 MG/DL (ref 8.5–10.1)
CHLORIDE SERPL-SCNC: 105 MMOL/L (ref 98–107)
CO2 SERPL-SCNC: 21 MMOL/L (ref 21–32)
CREAT SERPL-MCNC: 2.1 MG/DL (ref 0.7–1.3)
ERYTHROCYTE [DISTWIDTH] IN BLOOD BY AUTOMATED COUNT: 13.8 % (ref 10.5–14.5)
EST. AVERAGE GLUCOSE BLD GHB EST-MCNC: 375 MG/DL
GLUCOSE SERPL-MCNC: 256 MG/DL (ref 74–106)
GLYCOHEMOGLOBIN (HGB A1C): 14.7 % (ref 4.8–5.6)
HCT VFR BLD CALC: 33 % (ref 42–52)
HGB BLD-MCNC: 11.5 GM/DL (ref 14–18)
MCH RBC QN AUTO: 29.5 PG (ref 26–34)
MCHC RBC AUTO-ENTMCNC: 34.9 G/DL (ref 28–37)
MCV RBC: 84.6 FL (ref 80–100)
PLATELET # BLD: 225 THOU/UL (ref 150–400)
POTASSIUM SERPL-SCNC: 4.1 MMOL/L (ref 3.5–5.1)
RBC # BLD AUTO: 3.9 MIL/UL (ref 4.5–6)
SODIUM SERPL-SCNC: 137 MMOL/L (ref 136–145)
VIT B12 SERPL-MCNC: 563 PG/ML (ref 193–986)
WBC # BLD AUTO: 7.5 THOU/UL (ref 4–11)

## 2021-10-25 NOTE — EKG
[address] [employer]
[place]  [address]
Phone:  [phone]                    ELECTROCARDIOGRAM REPORT      
_______________________________________________________________________________
 
Name:       AMBER KIRAN         Room #:         213-P       ADM IN  
M.R.#:      [id number]     Account #:      [id number]  
Admission:  10/24/21    Attend Phys:    Agnes Faith MD        
Discharge:              Date of Birth:  [birth year]  
                                                          Report #: [id number]
   [id number]
_______________________________________________________________________________
                         [facility] ED
                                       
Test Date:    2021-10-24               Test Time:    00:08:56
Pat Name:     AMBER KIRAN           Department:   
Patient ID:   [id number]            Room:         213 P
Gender:       M                        Technician:   CORBY
:          1950               Requested By: Agnes Faith
Order Number: [id number] MD:   aFbian Reyes
                                 Measurements
Intervals                              Axis          
Rate:         75                       P:            61
OK:           176                      QRS:          -31
QRSD:         103                      T:            3
QT:           385                                    
QTc:          430                                    
                           Interpretive Statements
Sinus rhythm
Left ventricular hypertrophy
Anterior Q waves, possibly due to LVH
No previous ECG available for comparison
Electronically Signed On 10- 15:14:02 CDT by Fabian Reyes
[email or web address]
 
 
 
 
 
 
 
 
 
 
 
 
 
 
 
 
 
 
 
 
 
  <ELECTRONICALLY SIGNED>
   By: Fabian Reyes MD, [facility]    
  10/25/21     1514
D: 10/24/21 0008                           _____________________________________
T: 10/24/21 0008                           Fabian Reyes MD, FACC      /EPI

## 2021-10-25 NOTE — NUR
met with patient who admits with slurred speech. Patient resides at home with
son. Son not home during day is working. Patient has steps to enter home and
approx 10 steps inside home. He is independent with adls pta. He reports cont
to drive. Therapy evals in progress. Patient reports some weakness from being
in bed. He wants to ambulate in hallways. [facility] following for dc planning.

## 2021-10-25 NOTE — NUR
ASSESSMENT AS CHARTED -  MEDS AS PER MAR -  TOL DIET AND FLUIDS.  NO CO'S OF
PAIN OR NAUSEA.  PT HAD MRI COMPLETED THIS AM -  SES BY PHYS / OCC AND SPEECH
THERAPY TODAY.  PT UP IN ROOM WITH CARLA HEWITT  AT TIMES INDEPENDANTLY.  STATES
HE FEELS STEADY ON FEET.  IV FLUIDS CONTINUE AS ORDERED,  NO CO'S AT THE
PRESENT TIME.

## 2021-10-25 NOTE — NUR
UPON 0400 REASSESSMENT PATIENT STATED THAT HE HAD A "SEIZURE" EARLIER IN SHIFT
UNWITNESSED BY STAFF. NURSE ASKED PATIENT TO DESCRIBE SYMPTOMS OF SAID SEIZURE
AND PATIENT STATED HE HAD NUMBNESS ON RIGHT SIDE AND STARTED TO DROOL. PATIENT
THEN STATED THAT HE CAN ILLICIT SYMPTOMS OF "SEIZURE" AT ANY TIME BY RUBBING
HIS FEET TOGETHER. PATIENT THEN PRECEDED TO RUB FEET TOGETHER AND SPEAK IN
GARBLED SPEECH. DURING THIS EVENT NURSE ASSESSED PATIENTS STRENGTH AND FOUND
IT TO BE EQUAL BILATERALLY. WITHIN MINUTE PATIENT STARTED SPEAKING IN CLEAR
SPEECH AND SAID THE NUMBNESS "WENT AWAY". NURSE INSTRUCTED PATIENT TO NOT TRY
TO RECREATE FACTORS THAT LEAD TO SIMILAR SYMPTOMS UNTIL A PROVIDER ARRIVES AND
CAN WITNESS. PATIENT HAS HAD NO SLURRED SPEECH OR COMPLAINTS OF NUMBNESS
THROUGHOUT SHIFT BESIDES THIS ISOLATED INCIDENT. NURSE TO GIVE DETAILED REPORT
TO ONCOMING RN.

## 2021-10-26 VITALS — SYSTOLIC BLOOD PRESSURE: 143 MMHG | DIASTOLIC BLOOD PRESSURE: 89 MMHG

## 2021-10-26 VITALS — DIASTOLIC BLOOD PRESSURE: 92 MMHG | SYSTOLIC BLOOD PRESSURE: 165 MMHG

## 2021-10-26 VITALS — DIASTOLIC BLOOD PRESSURE: 81 MMHG | SYSTOLIC BLOOD PRESSURE: 146 MMHG

## 2021-10-26 VITALS — SYSTOLIC BLOOD PRESSURE: 138 MMHG | DIASTOLIC BLOOD PRESSURE: 77 MMHG

## 2021-10-26 VITALS — DIASTOLIC BLOOD PRESSURE: 90 MMHG | SYSTOLIC BLOOD PRESSURE: 171 MMHG

## 2021-10-26 NOTE — NUR
spoke with patient regarding dc planning. Discussed possible home health at
dc. Patient has no preference for home health agency. Referral to [facility].

## 2021-10-26 NOTE — NUR
CARE ASSUMMED THIS AM, PT AND ORIENTED X4, DENIES CHEST PAIN, NUMBNESS OR
TINGLING. SPEECH INTACT, NO SLURRING. ON ROOM AIR, NO SIGNS OF DISTRESS.PT
STAND BY. DENIES ANY NEEDS MISHA , WILL CONTINUE TO MONITOR

## 2021-10-26 NOTE — NUR
ASSUMED PT CARE AT 1900.PT DENIED PAIN SO FAR.BG ELEVATED AT HS SS
GIVEN.URINAL AT BEDSIDE.PT REQUESTED TO WALK AROUND THE UNIT AT HS,DID ONE LAP
BEFORE HE GOT TIRED AND WENT BACK TO HIS ROOM.NO TREMORS NOTED SO FAR.PT
SLEEPING ON HIS BED AT THIS TIME.CALL LIGHT WITHIN REACH.

## 2021-10-26 NOTE — NUR
NO COMPLAINTS OF PAIN,SZ NOTED SO FAR.DR ARTIS CALLED TO CHECK ON PT.PER
PHYSICIAN,EEG WAS OKAY.PER DR ARTIS,CONT WITH SZ PRECAUTIONS AND INFORM PT TO
CALL FOR ASSISTNACE TO THE BR.PT NOTIFIED,NOT HAPPY ABOUT IT.URINAL AT BEDSIDE
WITH ADEQUATE URINE OUTPUT.CALL LIGHT WITHIN REACH.

## 2021-10-27 VITALS — SYSTOLIC BLOOD PRESSURE: 150 MMHG | DIASTOLIC BLOOD PRESSURE: 87 MMHG

## 2021-10-27 VITALS — SYSTOLIC BLOOD PRESSURE: 149 MMHG | DIASTOLIC BLOOD PRESSURE: 85 MMHG

## 2021-10-27 NOTE — NUR
ASSESSMENT AS CHARTED - MEDS AS PER MAR -  MICHA DIET AND FLUIDS.  NO CO'S OF
NASUEA.  UP AD CHRISTIANO IN ROOM.  MRI/MRA COMPLETED AS ORDERED.  PT WITH NO CO'S OF
NUMBNESS / TINGLING / "SPELLS".  HOME THSI AFTERNOON.  INSTRUCTION RE HOME
MEDS/ CARE AND FOLLOW UP GIVEN - STATD UNDERSTANDING - HOME ACCOMAPNIED BY SON
VIA PVT VEHICLE.  NO CO'S AT TIME OF D/C.

## 2021-11-29 ENCOUNTER — HOSPITAL ENCOUNTER (OUTPATIENT)
Dept: HOSPITAL 35 - SJCVC | Age: 71
Discharge: HOME | End: 2021-11-29
Attending: INTERNAL MEDICINE
Payer: COMMERCIAL

## 2021-11-29 DIAGNOSIS — E11.9: ICD-10-CM

## 2021-11-29 DIAGNOSIS — R07.9: ICD-10-CM

## 2021-11-29 DIAGNOSIS — Z95.5: ICD-10-CM

## 2021-11-29 DIAGNOSIS — Z79.82: ICD-10-CM

## 2021-11-29 DIAGNOSIS — Z79.84: ICD-10-CM

## 2021-11-29 DIAGNOSIS — Z88.8: ICD-10-CM

## 2021-11-29 DIAGNOSIS — I25.10: ICD-10-CM

## 2021-11-29 DIAGNOSIS — Z79.899: ICD-10-CM

## 2021-11-29 DIAGNOSIS — E78.5: Primary | ICD-10-CM

## 2021-11-29 DIAGNOSIS — I10: ICD-10-CM

## 2021-12-01 ENCOUNTER — HOSPITAL ENCOUNTER (INPATIENT)
Dept: HOSPITAL 35 - ER | Age: 71
LOS: 3 days | Discharge: HOME | DRG: 286 | End: 2021-12-04
Attending: INTERNAL MEDICINE | Admitting: INTERNAL MEDICINE
Payer: COMMERCIAL

## 2021-12-01 VITALS — BODY MASS INDEX: 24.27 KG/M2 | WEIGHT: 137 LBS | HEIGHT: 62.99 IN

## 2021-12-01 VITALS — DIASTOLIC BLOOD PRESSURE: 73 MMHG | SYSTOLIC BLOOD PRESSURE: 159 MMHG

## 2021-12-01 VITALS — SYSTOLIC BLOOD PRESSURE: 143 MMHG | DIASTOLIC BLOOD PRESSURE: 78 MMHG

## 2021-12-01 VITALS — SYSTOLIC BLOOD PRESSURE: 139 MMHG | DIASTOLIC BLOOD PRESSURE: 84 MMHG

## 2021-12-01 DIAGNOSIS — I12.9: ICD-10-CM

## 2021-12-01 DIAGNOSIS — E11.22: ICD-10-CM

## 2021-12-01 DIAGNOSIS — Z95.5: ICD-10-CM

## 2021-12-01 DIAGNOSIS — Z82.49: ICD-10-CM

## 2021-12-01 DIAGNOSIS — Z79.899: ICD-10-CM

## 2021-12-01 DIAGNOSIS — I25.2: ICD-10-CM

## 2021-12-01 DIAGNOSIS — N18.30: ICD-10-CM

## 2021-12-01 DIAGNOSIS — E78.5: ICD-10-CM

## 2021-12-01 DIAGNOSIS — Z88.1: ICD-10-CM

## 2021-12-01 DIAGNOSIS — Z79.82: ICD-10-CM

## 2021-12-01 DIAGNOSIS — Z83.3: ICD-10-CM

## 2021-12-01 DIAGNOSIS — N17.0: ICD-10-CM

## 2021-12-01 DIAGNOSIS — Z28.21: ICD-10-CM

## 2021-12-01 DIAGNOSIS — Z20.822: ICD-10-CM

## 2021-12-01 DIAGNOSIS — I25.110: Primary | ICD-10-CM

## 2021-12-01 LAB
ALBUMIN SERPL-MCNC: 4.3 G/DL (ref 3.4–5)
ALT SERPL-CCNC: 14 U/L (ref 16–63)
ANION GAP SERPL CALC-SCNC: 9 MMOL/L (ref 7–16)
AST SERPL-CCNC: 12 U/L (ref 15–37)
BASOPHILS NFR BLD AUTO: 0.4 % (ref 0–2)
BILIRUB SERPL-MCNC: 0.4 MG/DL (ref 0.2–1)
BUN SERPL-MCNC: 36 MG/DL (ref 7–18)
CALCIUM SERPL-MCNC: 9.9 MG/DL (ref 8.5–10.1)
CHLORIDE SERPL-SCNC: 101 MMOL/L (ref 98–107)
CO2 SERPL-SCNC: 26 MMOL/L (ref 21–32)
CREAT SERPL-MCNC: 2.4 MG/DL (ref 0.7–1.3)
EOSINOPHIL NFR BLD: 8.6 % (ref 0–3)
ERYTHROCYTE [DISTWIDTH] IN BLOOD BY AUTOMATED COUNT: 14.1 % (ref 10.5–14.5)
GLUCOSE SERPL-MCNC: 149 MG/DL (ref 74–106)
GRANULOCYTES NFR BLD MANUAL: 45.6 % (ref 36–66)
HCT VFR BLD CALC: 32.1 % (ref 42–52)
HGB BLD-MCNC: 11.2 GM/DL (ref 14–18)
LIPASE: 354 U/L (ref 73–393)
LYMPHOCYTES NFR BLD AUTO: 35 % (ref 24–44)
MCH RBC QN AUTO: 29.1 PG (ref 26–34)
MCHC RBC AUTO-ENTMCNC: 34.7 G/DL (ref 28–37)
MCV RBC: 83.7 FL (ref 80–100)
MONOCYTES NFR BLD: 10.4 % (ref 1–8)
NEUTROPHILS # BLD: 3.4 THOU/UL (ref 1.4–8.2)
PLATELET # BLD: 236 THOU/UL (ref 150–400)
POTASSIUM SERPL-SCNC: 4 MMOL/L (ref 3.5–5.1)
PROT SERPL-MCNC: 7.7 G/DL (ref 6.4–8.2)
RBC # BLD AUTO: 3.83 MIL/UL (ref 4.5–6)
SODIUM SERPL-SCNC: 136 MMOL/L (ref 136–145)
WBC # BLD AUTO: 7.5 THOU/UL (ref 4–11)

## 2021-12-01 PROCEDURE — 10081 I&D PILONIDAL CYST COMP: CPT

## 2021-12-02 VITALS — DIASTOLIC BLOOD PRESSURE: 78 MMHG | SYSTOLIC BLOOD PRESSURE: 150 MMHG

## 2021-12-02 VITALS — SYSTOLIC BLOOD PRESSURE: 140 MMHG | DIASTOLIC BLOOD PRESSURE: 80 MMHG

## 2021-12-02 VITALS — DIASTOLIC BLOOD PRESSURE: 56 MMHG | SYSTOLIC BLOOD PRESSURE: 108 MMHG

## 2021-12-02 VITALS — SYSTOLIC BLOOD PRESSURE: 134 MMHG | DIASTOLIC BLOOD PRESSURE: 74 MMHG

## 2021-12-02 LAB
ANION GAP SERPL CALC-SCNC: 13 MMOL/L (ref 7–16)
BUN SERPL-MCNC: 36 MG/DL (ref 7–18)
CALCIUM SERPL-MCNC: 9.4 MG/DL (ref 8.5–10.1)
CHLORIDE SERPL-SCNC: 105 MMOL/L (ref 98–107)
CO2 SERPL-SCNC: 22 MMOL/L (ref 21–32)
CREAT SERPL-MCNC: 2.1 MG/DL (ref 0.7–1.3)
GLUCOSE SERPL-MCNC: 161 MG/DL (ref 74–106)
POTASSIUM SERPL-SCNC: 4.5 MMOL/L (ref 3.5–5.1)
SODIUM SERPL-SCNC: 140 MMOL/L (ref 136–145)

## 2021-12-02 NOTE — EKG
[place] [address]
[place]  [address]
Phone:  [phone]                    ELECTROCARDIOGRAM REPORT      
_______________________________________________________________________________
 
Name:       AMEBR KIRAN         Room #:         200-I       ADM IN  
M.R.#:      [id number]     Account #:      [id number]  
Admission:  21    Attend Phys:    Vito Domingo MD      
Discharge:              Date of Birth:  [birth year]  
                                                          Report #: [id number]
   [id number]
_______________________________________________________________________________
                         [facility] ED
                                       
Test Date:    2021               Test Time:    20:37:07
Pat Name:     AMBER KIRAN           Department:   
Patient ID:   [id number]            Room:         200
Gender:       M                        Technician:   unknown
:          1950               Requested By: David Cohen
Order Number: [id number] MD:   Fabian Reyes
                                 Measurements
Intervals                              Axis          
Rate:         78                       P:            55
HI:           189                      QRS:          -23
QRSD:         103                      T:            77
QT:           351                                    
QTc:          400                                    
                           Interpretive Statements
Sinus rhythm
Abnormal R-wave progression, early transition
Left ventricular hypertrophy
Nonspecific T abnormalities, lateral leads
Compared to ECG 10/24/2021 08:45:45
T-wave abnormality still present
Electronically Signed On 2021 8:04:13 CST by Fabian Reyes
[email or web address]
 
 
 
 
 
 
 
 
 
 
 
 
 
 
 
 
 
 
 
  <ELECTRONICALLY SIGNED>
   By: Fabian Reyes MD, [facility]    
  12/804
D: 21                           _____________________________________
T: 21                           Fabian Reyes MD, [facility]      /EPI

## 2021-12-03 VITALS — SYSTOLIC BLOOD PRESSURE: 121 MMHG | DIASTOLIC BLOOD PRESSURE: 61 MMHG

## 2021-12-03 VITALS — SYSTOLIC BLOOD PRESSURE: 149 MMHG | DIASTOLIC BLOOD PRESSURE: 84 MMHG

## 2021-12-03 VITALS — SYSTOLIC BLOOD PRESSURE: 115 MMHG | DIASTOLIC BLOOD PRESSURE: 71 MMHG

## 2021-12-03 VITALS — DIASTOLIC BLOOD PRESSURE: 58 MMHG | SYSTOLIC BLOOD PRESSURE: 133 MMHG

## 2021-12-03 NOTE — NUR
RECEIVED PATIENT AT 1900H.PATIENT IS ALERT AND ORIENTED X4.ON ROOM AIR
BREATHING SPONTANEOUSLY.NOT IN PAIN OR DISTRESS.KEPT NPO FROM MIDNIGHT.ALL
NEEDS ATTENDED.TO CONTINOUSLY MONITOR.

## 2021-12-04 VITALS — SYSTOLIC BLOOD PRESSURE: 139 MMHG | DIASTOLIC BLOOD PRESSURE: 76 MMHG

## 2021-12-04 VITALS — SYSTOLIC BLOOD PRESSURE: 153 MMHG | DIASTOLIC BLOOD PRESSURE: 94 MMHG

## 2021-12-04 VITALS — SYSTOLIC BLOOD PRESSURE: 150 MMHG | DIASTOLIC BLOOD PRESSURE: 70 MMHG

## 2021-12-04 LAB
ANION GAP SERPL CALC-SCNC: 9 MMOL/L (ref 7–16)
BUN SERPL-MCNC: 27 MG/DL (ref 7–18)
CALCIUM SERPL-MCNC: 8.9 MG/DL (ref 8.5–10.1)
CHLORIDE SERPL-SCNC: 105 MMOL/L (ref 98–107)
CO2 SERPL-SCNC: 24 MMOL/L (ref 21–32)
CREAT SERPL-MCNC: 2.2 MG/DL (ref 0.7–1.3)
GLUCOSE SERPL-MCNC: 222 MG/DL (ref 74–106)
POTASSIUM SERPL-SCNC: 4.4 MMOL/L (ref 3.5–5.1)
SODIUM SERPL-SCNC: 138 MMOL/L (ref 136–145)

## 2021-12-04 NOTE — NUR
DISCHARGED PATIENT. EXPLAINED DISCHARGE INSTRUCTIONS TO PATIENT, HIGHLIGHTED
UPCOMING APPOINTMENTS AND NEW MEDICATIONS. ALL QUESTIONS ANSWERED, PT STATED
HE UNDERSTOOD AND DENIED ANY ADDITIONAL QUESTIONS.

## 2021-12-04 NOTE — NUR
RECIEVED PATIENT AT 1900H.PATIENT IS ALERT AN DORIENTED X4.ON ROOM AIR
BREATHING SPONTANEOUSLY.NOT IN PAIN OR DISTRESS.WITH RIGHT GROIN DRESSING
C/D/I.ALL NEEDS ATTENDED.TO CONTINOUSLY MONITOR.

## 2021-12-09 ENCOUNTER — HOSPITAL ENCOUNTER (EMERGENCY)
Dept: HOSPITAL 35 - ER | Age: 71
Discharge: HOME | End: 2021-12-09
Payer: COMMERCIAL

## 2021-12-09 VITALS — WEIGHT: 160.01 LBS | BODY MASS INDEX: 28.35 KG/M2 | HEIGHT: 63 IN

## 2021-12-09 VITALS — SYSTOLIC BLOOD PRESSURE: 176 MMHG | DIASTOLIC BLOOD PRESSURE: 87 MMHG

## 2021-12-09 DIAGNOSIS — E11.22: ICD-10-CM

## 2021-12-09 DIAGNOSIS — R10.13: ICD-10-CM

## 2021-12-09 DIAGNOSIS — Z88.8: ICD-10-CM

## 2021-12-09 DIAGNOSIS — Z79.82: ICD-10-CM

## 2021-12-09 DIAGNOSIS — N18.9: ICD-10-CM

## 2021-12-09 DIAGNOSIS — Z79.1: ICD-10-CM

## 2021-12-09 DIAGNOSIS — Z79.891: ICD-10-CM

## 2021-12-09 DIAGNOSIS — Z79.84: ICD-10-CM

## 2021-12-09 DIAGNOSIS — R07.89: Primary | ICD-10-CM

## 2021-12-09 DIAGNOSIS — I25.10: ICD-10-CM

## 2021-12-09 DIAGNOSIS — I12.9: ICD-10-CM

## 2021-12-09 DIAGNOSIS — Z79.899: ICD-10-CM

## 2021-12-09 LAB
ALBUMIN SERPL-MCNC: 4 G/DL (ref 3.4–5)
ALT SERPL-CCNC: 16 U/L (ref 30–65)
ANION GAP SERPL CALC-SCNC: 9 MMOL/L (ref 7–16)
AST SERPL-CCNC: 11 U/L (ref 15–37)
BASOPHILS NFR BLD AUTO: 1.1 % (ref 0–2)
BILIRUB SERPL-MCNC: 0.4 MG/DL (ref 0.2–1)
BUN SERPL-MCNC: 35 MG/DL (ref 7–18)
CALCIUM SERPL-MCNC: 9.6 MG/DL (ref 8.5–10.1)
CHLORIDE SERPL-SCNC: 101 MMOL/L (ref 98–107)
CO2 SERPL-SCNC: 25 MMOL/L (ref 21–32)
CREAT SERPL-MCNC: 2.3 MG/DL (ref 0.7–1.3)
EOSINOPHIL NFR BLD: 5.3 % (ref 0–3)
ERYTHROCYTE [DISTWIDTH] IN BLOOD BY AUTOMATED COUNT: 14.7 % (ref 10.5–14.5)
GLUCOSE SERPL-MCNC: 231 MG/DL (ref 74–106)
GRANULOCYTES NFR BLD MANUAL: 65.9 % (ref 36–66)
HCT VFR BLD CALC: 32.1 % (ref 42–52)
HGB BLD-MCNC: 10.8 GM/DL (ref 14–18)
LYMPHOCYTES NFR BLD AUTO: 19.5 % (ref 24–44)
MCH RBC QN AUTO: 28.3 PG (ref 26–34)
MCHC RBC AUTO-ENTMCNC: 33.5 G/DL (ref 28–37)
MCV RBC: 84.6 FL (ref 80–100)
MONOCYTES NFR BLD: 8.2 % (ref 1–8)
NEUTROPHILS # BLD: 5.9 THOU/UL (ref 1.4–8.2)
PLATELET # BLD: 216 THOU/UL (ref 150–400)
POTASSIUM SERPL-SCNC: 4.4 MMOL/L (ref 3.5–5.1)
PROT SERPL-MCNC: 7.4 G/DL (ref 6.4–8.2)
RBC # BLD AUTO: 3.8 MIL/UL (ref 4.5–6)
SODIUM SERPL-SCNC: 135 MMOL/L (ref 136–145)
WBC # BLD AUTO: 9 THOU/UL (ref 4–11)

## 2021-12-09 NOTE — EKG
[place] [address] [facility] [place]  [address]
Phone:  [phone]                    ELECTROCARDIOGRAM REPORT      
_______________________________________________________________________________
 
Name:       AMBER KIRAN         [facility] #:                     [facility]      [id number]     Account #:      [id number]  
Admission:  21    Attend Phys:                          
Discharge:              Date of Birth:  [birth year]  
                                                          Report #: [id number]
   [id number]
_______________________________________________________________________________
                         [facility] ED
                                       
Test Date:    2021               Test Time:    07:28:46
Pat Name:     AMBER KIRAN           Department:   
Patient ID:   [id number]            Room:          
Gender:       M                        Technician:   
:          1950               Requested By: Titus Gonzalez
Order Number: [id number] MD:   Jayson Johnson
                                 Measurements
Intervals                              Axis          
Rate:         77                       P:            55
[place]:           197                      QRS:          -24
QRSD:         102                      T:            31
QT:           372                                    
QTc:          421                                    
                           Interpretive Statements
Sinus rhythm
Left ventricular hypertrophy
Anterior Q waves, possibly due to LVH
Compared to ECG 2021 20:37:07
No significant change was found
Electronically Signed On 2021 8:55:47 CST by Jayson Johnson
[email or web address]
 
 
 
 
 
 
 
 
 
 
 
 
 
 
 
 
 
 
 
 
  <ELECTRONICALLY SIGNED>
   By: Jayson Johnson MD, [facility]   
  21     0855
D: 21 0728                           _____________________________________
T: 21 0728                           Jayson Johnson MD, FACC     /EPI

## 2023-08-06 NOTE — EKG
[place] [address]
Phone:  [phone]                     ELECTROCARDIOGRAM REPORT      
_______________________________________________________________________________
 
Name:       AMBER KIRAN SR            Room:           [address]    ADM [place]  
M.R.#:  [id number]      Account #:      [id number]  
Admission:  18     Attend Phys:    Jerald June MD, 
Discharge:               Date of Birth:  [birth year]  
         Report #: [id number]
    [id number]
_______________________________________________________________________________
THIS REPORT FOR:  //name//                      
 
                          [facility]
                                       
Test Date:    2018               Test Time:    10:22:37
Pat Name:     AMBER KIRAN           Department:   
Patient ID:   [id number]            Room:         [facility]
Gender:       M                        Technician:   
:          1950               Requested By: Jerald June
Order Number: [id number]    Harpal MD:   Jerald June
                                 Measurements
Intervals                              Axis          
Rate:         64                       P:            -29
IL:           207                      QRS:          -32
QRSD:         99                       T:            1
QT:           385                                    
QTc:          398                                    
                           Interpretive Statements
Sinus rhythm
RSR' in V1 or V2, right VCD or RVH
Left ventricular hypertrophy
Borderline T abnormalities, inferior leads
Compared to ECG 2017 03:24:22
RSR' in V1 or V2 now present
 
Electronically Signed On 2018 16:13:54 CDT by Jerald June
[email or web address]
 
 
 
 
 
 
 
 
 
 
 
 
 
 
 
 
  <ELECTRONICALLY SIGNED>
                                           By: Jerald June MD, [facility]     
  18     1613
D: 18 1022   _____________________________________
T: 18 1022   Jerald June MD, [facility]       /EPI Our Emergency Department Referral Coordinators will be reaching out to you in the next 24-48 hours from 9:00am to 5:00pm with a follow up appointment. Please expect a phone call from the hospital in that time frame. If you do not receive a call or if you have any questions or concerns, you can reach them at   [phone]      Larson Catheter Care, Adult    A Larson catheter is a soft, flexible tube that is placed into the bladder to drain urine. A Larson catheter may be inserted if:    You leak urine or are not able to control when you urinate (urinary incontinence).  You are not able to urinate when you need to (urinary retention).   You had prostate surgery or surgery on the genitals.  You have certain medical conditions, such as multiple sclerosis, dementia, or a spinal cord injury.    If you are going home with a Larson catheter in place, follow the instructions below.    TAKING CARE OF THE CATHETER   Wash your hands with soap and water.   Using mild soap and warm water on a clean washcloth:    Clean the area on your body closest to the catheter insertion site using a circular motion, moving away from the catheter. Never wipe toward the catheter because this could sweep bacteria up into the urethra and cause infection.   Remove all traces of soap. Pat the area dry with a clean towel. For males, reposition the foreskin.    Attach the catheter to your leg so there is no tension on the catheter. Use adhesive tape or a leg strap. If you are using adhesive tape, remove any sticky residue left behind by the previous tape you used.   Keep the drainage bag below the level of the bladder, but keep it off the floor.   Check throughout the day to be sure the catheter is working and urine is draining freely. Make sure the tubing does not become kinked.  Do not pull on the catheter or try to remove it. Pulling could damage internal tissues.    TAKING CARE OF THE DRAINAGE BAGS  You will be given two drainage bags to take home. One is a large overnight drainage bag, and the other is a smaller leg bag that fits underneath clothing. You may wear the overnight bag at any time, but you should never wear the smaller leg bag at night. Follow the instructions below for how to empty, change, and clean your drainage bags.    Emptying the Drainage Bag    You must empty your drainage bag when it is ?–½ full or at least 2–3 times a day.     Wash your hands with soap and water.   Keep the drainage bag below your hips, below the level of your bladder. This stops urine from going back into the tubing and into your bladder.    Hold the dirty bag over the toilet or a clean container.   Open the pour spout at the bottom of the bag and empty the urine into the toilet or container. Do not let the pour spout touch the toilet, container, or any other surface. Doing so can place bacteria on the bag, which can cause an infection.   Clean the pour spout with a gauze pad or cotton ball that has rubbing alcohol on it.   Close the pour spout.   Attach the bag to your leg with adhesive tape or a leg strap.   Wash your hands well.    Changing the Drainage Bag    Change your drainage bag once a month or sooner if it starts to smell bad or look dirty. Below are steps to follow when changing the drainage bag.     Wash your hands with soap and water.   Pinch off the rubber catheter so that urine does not spill out.   Disconnect the catheter tube from the drainage tube at the connection valve. Do not let the tubes touch any surface.    Clean the end of the catheter tube with an alcohol wipe. Use a different alcohol wipe to clean the end of the drainage tube.   Connect the catheter tube to the drainage tube of the clean drainage bag.   Attach the new bag to the leg with adhesive tape or a leg strap. Avoid attaching the new bag too tightly.    Wash your hands well.    Cleaning the Drainage Bag     Wash your hands with soap and water.   Wash the bag in warm, soapy water.   Rinse the bag thoroughly with warm water.   Fill the bag with a solution of white vinegar and water (1 cup vinegar to 1 qt warm water [.2 L vinegar to 1 L warm water]). Close the bag and soak it for 30 minutes in the solution.    Rinse the bag with warm water.    Hang the bag to dry with the pour spout open and hanging downward.   Store the clean bag (once it is dry) in a clean plastic bag.   Wash your hands well.     PREVENTING INFECTION  Wash your hands before and after handling your catheter.  Take showers daily and wash the area where the catheter enters your body. Do not take baths. Replace wet leg straps with dry ones, if this applies.  Do not use powders, sprays, or lotions on the genital area. Only use creams, lotions, or ointments as directed by your caregiver.  For females, wipe from front to back after each bowel movement.   Drink enough fluids to keep your urine clear or pale yellow unless you have a fluid restriction.   Do not let the drainage bag or tubing touch or lie on the floor.   Wear cotton underwear to absorb moisture and to keep your .    SEEK MEDICAL CARE IF:  Your urine is cloudy or smells unusually bad.  Your catheter becomes clogged.  You are not draining urine into the bag or your bladder feels full.  Your catheter starts to leak.    SEEK IMMEDIATE MEDICAL CARE IF:  You have pain, swelling, redness, or pus where the catheter enters the body.  You have pain in the abdomen, legs, lower back, or bladder.  You have a fever.  You see blood fill the catheter, or your urine is pink or red.  You have nausea, vomiting, or chills.  Your catheter gets pulled out.    MAKE SURE YOU:  Understand these instructions.  Will watch your condition.  Will get help right away if you are not doing well or get worse.    ADDITIONAL NOTES AND INSTRUCTIONS    Please follow up with your Primary MD in 24-48 hr.  Seek immediate medical care for any new/worsening signs or symptoms.